# Patient Record
Sex: MALE | HISPANIC OR LATINO | Employment: OTHER | URBAN - METROPOLITAN AREA
[De-identification: names, ages, dates, MRNs, and addresses within clinical notes are randomized per-mention and may not be internally consistent; named-entity substitution may affect disease eponyms.]

---

## 2017-02-20 ENCOUNTER — OFFICE VISIT (OUTPATIENT)
Dept: ORTHOPEDIC SURGERY | Facility: CLINIC | Age: 67
End: 2017-02-20

## 2017-02-20 VITALS
HEART RATE: 53 BPM | DIASTOLIC BLOOD PRESSURE: 81 MMHG | HEIGHT: 64 IN | WEIGHT: 158 LBS | SYSTOLIC BLOOD PRESSURE: 186 MMHG | BODY MASS INDEX: 26.98 KG/M2

## 2017-02-20 DIAGNOSIS — R52 PAIN: Primary | ICD-10-CM

## 2017-02-20 DIAGNOSIS — M17.0 PRIMARY OSTEOARTHRITIS OF KNEES, BILATERAL: ICD-10-CM

## 2017-02-20 PROCEDURE — 20610 DRAIN/INJ JOINT/BURSA W/O US: CPT | Performed by: NURSE PRACTITIONER

## 2017-02-20 PROCEDURE — 99203 OFFICE O/P NEW LOW 30 MIN: CPT | Performed by: NURSE PRACTITIONER

## 2017-02-20 PROCEDURE — 73562 X-RAY EXAM OF KNEE 3: CPT | Performed by: NURSE PRACTITIONER

## 2017-02-20 RX ORDER — TERBINAFINE HYDROCHLORIDE 250 MG/1
250 TABLET ORAL DAILY
COMMUNITY
End: 2019-07-03

## 2017-02-20 RX ORDER — CARVEDILOL 12.5 MG/1
12.5 TABLET ORAL 2 TIMES DAILY WITH MEALS
COMMUNITY
End: 2019-07-03

## 2017-02-20 RX ORDER — LISINOPRIL 10 MG/1
10 TABLET ORAL DAILY
COMMUNITY
End: 2019-07-03

## 2017-02-20 RX ORDER — ALLOPURINOL 100 MG/1
100 TABLET ORAL DAILY
COMMUNITY
End: 2019-07-03

## 2017-02-20 RX ADMIN — LIDOCAINE HYDROCHLORIDE 4 ML: 10 INJECTION, SOLUTION INFILTRATION; PERINEURAL at 10:06

## 2017-02-20 RX ADMIN — BUPIVACAINE HYDROCHLORIDE 4 ML: 5 INJECTION, SOLUTION EPIDURAL; INTRACAUDAL at 10:06

## 2017-02-20 RX ADMIN — TRIAMCINOLONE ACETONIDE 80 MG: 40 INJECTION, SUSPENSION INTRA-ARTICULAR; INTRAMUSCULAR at 10:06

## 2017-02-20 NOTE — PROGRESS NOTES
Subjective:     Patient ID: Pato Charles is a 67 y.o. male.    Chief Complaint: Bilateral knee pain    History of Present Illness  Patient is 67 y.o male who presents with a reported four year history of pain at bilateral knees. Pain presents at medial and anterior aspect, increased pain noted with ascending and descending stairs, with positional changes such as from sitting to standing and in am when he wakes. 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 Rates pain at 8-10 out of 10, increased pain with activity, pain mildly relieved with rest. Has previously taken tylenol as needed for symptom relief however no longer taking because medication was not helpful. States this is the first time that he has ever had health insurance which is why he presents at this time for symptoms. Denies known injury bilateral knees. Denies presence of numbness or tingling at bilateral lower extremities. Denies experiencing low back pain. Denies all other concerns present at this time.      Social History     Occupational History   • Not on file.     Social History Main Topics   • Smoking status: Not on file   • Smokeless tobacco: Not on file   • Alcohol use Not on file   • Drug use: Not on file   • Sexual activity: Not on file      History reviewed. No pertinent past medical history.  History reviewed. No pertinent past surgical history.    No family history on file.      Review of Systems   Constitutional: Negative for chills, diaphoresis, fever and unexpected weight change.   HENT: Negative for hearing loss, nosebleeds, sore throat and tinnitus.    Eyes: Negative for pain and visual disturbance.   Respiratory: Negative for cough, shortness of breath and wheezing.    Cardiovascular: Negative for chest pain and palpitations.   Gastrointestinal: Negative for abdominal pain, diarrhea, nausea and vomiting.   Endocrine: Negative for cold intolerance,  "heat intolerance and polydipsia.   Genitourinary: Negative for difficulty urinating, dysuria and hematuria.   Musculoskeletal: Positive for arthralgias and joint swelling. Negative for myalgias.   Skin: Positive for rash. Negative for wound.   Allergic/Immunologic: Negative for environmental allergies.   Neurological: Negative for dizziness, syncope and numbness.   Hematological: Does not bruise/bleed easily.   Psychiatric/Behavioral: Negative for dysphoric mood and sleep disturbance. The patient is not nervous/anxious.            Objective:  Physical Exam    Vital signs reviewed.   General: No acute distress.  Eyes: conjunctiva clear; pupils equally round and reactive  ENT: external ears and nose atraumatic; oropharynx clear  CV: no peripheral edema  Resp: normal respiratory effort  Skin: no rashes or wounds; normal turgor  Psych: mood and affect appropriate; recent and remote memory intact    Vitals:    02/20/17 0934   BP: (!) 186/81   Pulse: 53   Weight: 158 lb (71.7 kg)   Height: 64\" (162.6 cm)     Last 2 weights    02/20/17  0934   Weight: 158 lb (71.7 kg)     Body mass index is 27.12 kg/(m^2).     Right Knee Exam     Tenderness   The patient is experiencing tenderness in the medial joint line and patella.    Range of Motion   Extension: 0   Flexion: 120     Tests   Priyanka:  Medial - negative Lateral - negative  Lachman:  Anterior - 1+    Posterior - negative  Drawer:       Anterior - negative    Posterior - negative  Varus: negative  Valgus: negative  Patellar Apprehension: positive    Other   Erythema: absent  Scars: absent  Sensation: normal  Pulse: present  Swelling: none  Other tests: no effusion present    Comments:  Crepitus throughout arc of motion      Left Knee Exam     Tenderness   The patient is experiencing tenderness in the medial joint line and patella.    Range of Motion   Extension: 0   Flexion: 120     Tests   Priyanka:  Medial - negative Lateral - negative  Lachman:  Anterior - 1+    " Posterior - negative  Drawer:       Anterior - negative     Posterior - negative  Varus: negative  Valgus: negative  Patellar Apprehension: positive    Other   Erythema: absent  Scars: absent  Sensation: normal  Pulse: present  Swelling: none  Effusion: no effusion present    Comments:  Crepitus throughout arc of motion              Imaging:  Bilateral Knee X-Ray  Indication: Pain    AP, Lateral, and [place] views    Findings:  No fracture  Normal soft tissues  Tricompartmental osteoarthritis bilateral knees, greater at medial aspect and patellofemoral joint    No prior studies were available for comparison.    Assessment:       1. Pain    2. Primary osteoarthritis of knees, bilateral          Plan:  1. Discussed plan of care with patient. Wishes to proceed with corticosteroid injections bilateral knees.   2. Discussed viscosupplement injections if not receiving significant relief with corticosteroid injections however will likely not complete secondary to cost. Will follow-up with patient after he returns from trip. Patient verbalized understanding of all information and agrees with plan of care. Denies all other concerns present at this time.     NICO query complete.    Large Joint Arthrocentesis  Date/Time: 2/20/2017 10:06 AM  Consent given by: patient  Site marked: site marked  Timeout: Immediately prior to procedure a time out was called to verify the correct patient, procedure, equipment, support staff and site/side marked as required   Supporting Documentation  Indications: pain   Procedure Details  Location: knee - Knee joint: BILATERAL.  Preparation: Patient was prepped and draped in the usual sterile fashion  Needle size: 22 G  Medications administered: 4 mL bupivacaine (PF) 0.5 %; 4 mL lidocaine 1 %; 80 mg triamcinolone acetonide 40 MG/ML  Patient tolerance: patient tolerated the procedure well with no immediate complications

## 2017-02-21 PROBLEM — M17.0 PRIMARY OSTEOARTHRITIS OF KNEES, BILATERAL: Status: ACTIVE | Noted: 2017-02-21

## 2017-02-21 RX ORDER — TRIAMCINOLONE ACETONIDE 40 MG/ML
80 INJECTION, SUSPENSION INTRA-ARTICULAR; INTRAMUSCULAR
Status: COMPLETED | OUTPATIENT
Start: 2017-02-20 | End: 2017-02-20

## 2017-02-21 RX ORDER — BUPIVACAINE HYDROCHLORIDE 5 MG/ML
4 INJECTION, SOLUTION EPIDURAL; INTRACAUDAL
Status: COMPLETED | OUTPATIENT
Start: 2017-02-20 | End: 2017-02-20

## 2017-02-21 RX ORDER — LIDOCAINE HYDROCHLORIDE 10 MG/ML
4 INJECTION, SOLUTION INFILTRATION; PERINEURAL
Status: COMPLETED | OUTPATIENT
Start: 2017-02-20 | End: 2017-02-20

## 2017-07-28 ENCOUNTER — OFFICE VISIT (OUTPATIENT)
Dept: ORTHOPEDIC SURGERY | Facility: CLINIC | Age: 67
End: 2017-07-28

## 2017-07-28 DIAGNOSIS — M17.0 PRIMARY OSTEOARTHRITIS OF KNEES, BILATERAL: Primary | ICD-10-CM

## 2017-07-28 PROCEDURE — 20610 DRAIN/INJ JOINT/BURSA W/O US: CPT | Performed by: NURSE PRACTITIONER

## 2017-07-28 RX ORDER — MELOXICAM 7.5 MG/1
7.5 TABLET ORAL DAILY
Qty: 30 TABLET | Refills: 2 | Status: SHIPPED | OUTPATIENT
Start: 2017-07-28 | End: 2017-08-31

## 2017-07-28 RX ADMIN — LIDOCAINE HYDROCHLORIDE 4 ML: 10 INJECTION, SOLUTION EPIDURAL; INFILTRATION; INTRACAUDAL; PERINEURAL at 14:34

## 2017-07-28 RX ADMIN — TRIAMCINOLONE ACETONIDE 80 MG: 40 INJECTION, SUSPENSION INTRA-ARTICULAR; INTRAMUSCULAR at 14:34

## 2017-07-28 RX ADMIN — BUPIVACAINE HYDROCHLORIDE 4 ML: 5 INJECTION, SOLUTION EPIDURAL; INTRACAUDAL at 14:34

## 2017-07-28 NOTE — PROGRESS NOTES
Subjective:     Patient ID: Pato Charles is a 67 y.o. male.    Chief Complaint: follow-up bilateral knee pain, primary osteoarthritis bilateral knees     History of Present Illness    Mr. Charles presents for follow-up bilateral knee pain. Received great success after receiving corticosteroid injections at last visit with this APRN on 02/20/2017. He now has health insurance which includes drug coverage and would like to discuss possibly receiving oral therapy for osteoarthritis. Has been doing very well with injections however pain has returned within the last two weeks and he is ready to discuss proceeding with corticosteroid injections once again. He is scheduled to leave for [facility] to visit his wife and children around August 15th and will return approximately three weeks after that time. Bilateral knee pain, present at medial joint line and patella. Increased with walking for long periods of time, ascending and descending stairs and with positional changes such as from seated to standing at attempting to walk. Denies bilateral knee giving away, locking or buckling. Rates pain at 7-8 out of 10, aching in nature. Denies presence of numbness or tingling bilateral lower extremities. Denies all other concerns at this time.      Social History     Occupational History   • Not on file.     Social History Main Topics   • Smoking status: Not on file   • Smokeless tobacco: Not on file   • Alcohol use Not on file   • Drug use: Not on file   • Sexual activity: Not on file      No past medical history on file.  No past surgical history on file.    No family history on file.      Review of Systems   Constitutional: Negative for chills, diaphoresis, fever and unexpected weight change.   HENT: Negative for hearing loss, nosebleeds, sore throat and tinnitus.    Eyes: Negative for pain and visual disturbance.   Respiratory: Negative for cough, shortness of breath and wheezing.    Cardiovascular: Negative for chest pain and  palpitations.   Gastrointestinal: Negative for abdominal pain, diarrhea, nausea and vomiting.   Endocrine: Negative for cold intolerance, heat intolerance and polydipsia.   Genitourinary: Negative for difficulty urinating, dysuria and hematuria.   Musculoskeletal: Positive for arthralgias. Negative for joint swelling and myalgias.   Skin: Negative for rash and wound.   Allergic/Immunologic: Negative for environmental allergies.   Neurological: Negative for dizziness, syncope and numbness.   Hematological: Does not bruise/bleed easily.   Psychiatric/Behavioral: Negative for dysphoric mood and sleep disturbance. The patient is not nervous/anxious.            Objective:  Physical Exam    General: No acute distress.  Eyes: conjunctiva clear; pupils equally round and reactive  ENT: external ears and nose atraumatic; oropharynx clear  CV: no peripheral edema  Resp: normal respiratory effort  Skin: no rashes or wounds; normal turgor  Psych: mood and affect appropriate; recent and remote memory intact    There were no vitals filed for this visit.  There were no vitals filed for this visit.  There is no height or weight on file to calculate BMI.     Ortho Exam      Right Knee Exam      Tenderness   The patient is experiencing tenderness in the medial joint line and patella.     Range of Motion   Extension: 0   Flexion: 120      Tests   Priyanka:  Medial - negative Lateral - negative  Lachman:  Anterior - 1+    Posterior - negative  Drawer:       Anterior - negative    Posterior - negative  Varus: negative  Valgus: negative  Patellar Apprehension: positive     Other   Erythema: absent  Scars: absent  Sensation: normal  Pulse: present  Swelling: moderate  Other tests: no effusion present     Comments:  Crepitus throughout arc of motion        Left Knee Exam      Tenderness   The patient is experiencing tenderness in the medial joint line and patella.  Range of Motion   Extension: 0   Flexion: 120  Tests   Priyanka:  Medial -  negative Lateral - negative  Lachman:  Anterior - 1+    Posterior - negative  Drawer:       Anterior - negative     Posterior - negative  Varus: negative  Valgus: negative  Patellar Apprehension: positive  Other   Erythema: absent  Scars: absent  Sensation: normal  Pulse: present  Swelling: mild  Effusion: none     Comments:  Crepitus throughout arc of motion    Assessment:       1. Primary osteoarthritis of knees, bilateral          Plan:  1. Discussed plan of care with patient. Wishes to proceed with corticosteroid injections bilateral knees.  2. Will start him on meloxicam 7.5 mg once daily. Encouraged him to find PCP and follow-up for lab work. Currently being seen at a clinic in [facility]. Will plan to see him when he returns from [facility]. Discussed possible visco supplement injections at bilateral knees along with cost to patient. Patient verbalized understanding of all information and agrees with plan of care. Denies all other concerns present at this time.       Large Joint Arthrocentesis  Date/Time: 7/28/2017 2:34 PM  Consent given by: patient  Site marked: site marked  Timeout: Immediately prior to procedure a time out was called to verify the correct patient, procedure, equipment, support staff and site/side marked as required   Supporting Documentation  Indications: pain   Procedure Details  Location: knee - Knee joint: BILATERAL.  Preparation: Patient was prepped and draped in the usual sterile fashion  Needle size: 22 G  Medications administered: 4 mL bupivacaine (PF) 0.5 %; 4 mL lidocaine PF 1% 1 %; 80 mg triamcinolone acetonide 40 MG/ML  Patient tolerance: patient tolerated the procedure well with no immediate complications

## 2017-07-29 RX ORDER — BUPIVACAINE HYDROCHLORIDE 5 MG/ML
4 INJECTION, SOLUTION EPIDURAL; INTRACAUDAL
Status: COMPLETED | OUTPATIENT
Start: 2017-07-28 | End: 2017-07-28

## 2017-07-29 RX ORDER — TRIAMCINOLONE ACETONIDE 40 MG/ML
80 INJECTION, SUSPENSION INTRA-ARTICULAR; INTRAMUSCULAR
Status: COMPLETED | OUTPATIENT
Start: 2017-07-28 | End: 2017-07-28

## 2017-07-29 RX ORDER — LIDOCAINE HYDROCHLORIDE 10 MG/ML
4 INJECTION, SOLUTION EPIDURAL; INFILTRATION; INTRACAUDAL; PERINEURAL
Status: COMPLETED | OUTPATIENT
Start: 2017-07-28 | End: 2017-07-28

## 2017-08-31 ENCOUNTER — OFFICE VISIT (OUTPATIENT)
Dept: ORTHOPEDIC SURGERY | Facility: CLINIC | Age: 67
End: 2017-08-31

## 2017-08-31 DIAGNOSIS — M17.0 PRIMARY OSTEOARTHRITIS OF KNEES, BILATERAL: Primary | ICD-10-CM

## 2017-08-31 PROCEDURE — 99213 OFFICE O/P EST LOW 20 MIN: CPT | Performed by: NURSE PRACTITIONER

## 2017-08-31 PROCEDURE — 20610 DRAIN/INJ JOINT/BURSA W/O US: CPT | Performed by: NURSE PRACTITIONER

## 2017-08-31 RX ORDER — MELOXICAM 15 MG/1
15 TABLET ORAL DAILY
Qty: 30 TABLET | Refills: 3 | Status: SHIPPED | OUTPATIENT
Start: 2017-08-31 | End: 2019-07-03

## 2017-09-12 ENCOUNTER — TELEPHONE (OUTPATIENT)
Dept: ORTHOPEDIC SURGERY | Facility: CLINIC | Age: 67
End: 2017-09-12

## 2017-12-04 ENCOUNTER — OFFICE VISIT (OUTPATIENT)
Dept: ORTHOPEDIC SURGERY | Facility: CLINIC | Age: 67
End: 2017-12-04

## 2017-12-04 DIAGNOSIS — R52 PAIN: Primary | ICD-10-CM

## 2017-12-04 DIAGNOSIS — M17.0 PRIMARY OSTEOARTHRITIS OF KNEES, BILATERAL: ICD-10-CM

## 2017-12-04 PROCEDURE — 20610 DRAIN/INJ JOINT/BURSA W/O US: CPT | Performed by: NURSE PRACTITIONER

## 2017-12-04 RX ORDER — LIDOCAINE HYDROCHLORIDE 10 MG/ML
4 INJECTION, SOLUTION EPIDURAL; INFILTRATION; INTRACAUDAL; PERINEURAL
Status: COMPLETED | OUTPATIENT
Start: 2017-12-04 | End: 2017-12-04

## 2017-12-04 RX ORDER — TRIAMCINOLONE ACETONIDE 40 MG/ML
80 INJECTION, SUSPENSION INTRA-ARTICULAR; INTRAMUSCULAR
Status: COMPLETED | OUTPATIENT
Start: 2017-12-04 | End: 2017-12-04

## 2017-12-04 RX ORDER — BUPIVACAINE HYDROCHLORIDE 5 MG/ML
4 INJECTION, SOLUTION EPIDURAL; INTRACAUDAL
Status: COMPLETED | OUTPATIENT
Start: 2017-12-04 | End: 2017-12-04

## 2017-12-04 RX ADMIN — BUPIVACAINE HYDROCHLORIDE 4 ML: 5 INJECTION, SOLUTION EPIDURAL; INTRACAUDAL at 15:46

## 2017-12-04 RX ADMIN — LIDOCAINE HYDROCHLORIDE 4 ML: 10 INJECTION, SOLUTION EPIDURAL; INFILTRATION; INTRACAUDAL; PERINEURAL at 15:46

## 2017-12-04 RX ADMIN — TRIAMCINOLONE ACETONIDE 80 MG: 40 INJECTION, SUSPENSION INTRA-ARTICULAR; INTRAMUSCULAR at 15:46

## 2017-12-04 NOTE — PROGRESS NOTES
Subjective:     Patient ID: Pato Charles is a 67 y.o. male.    Chief Complaint: Follow-up follow-up primary osteoarthritis bilateral knees    History of Present Illness    Mr. Charles presents back to clinic for follow-up of bilateral knee pain.  He received Visco supplement injections, Synvisc one on August 31, 2017 with significant symptom relief for the first 3 months.  Since that time he is began experiencing pain returned at the medial joint lines at bilateral knees.  Increased pain noted with all ambulatory activities and with change of position such as from seated to standing and attempting to walk.  Denies that he is able to undergo any surgery at this time.  Denies presence of numbness and tingling radiating down bilateral lower extremities.  Denies other concerns present this time.     Social History     Occupational History   • Not on file.     Social History Main Topics   • Smoking status: Former Smoker     Quit date: 2001   • Smokeless tobacco: Not on file   • Alcohol use Yes   • Drug use: Not on file   • Sexual activity: Not on file      No past medical history on file.  No past surgical history on file.    No family history on file.      Review of Systems   Constitutional: Negative for chills, diaphoresis, fever and unexpected weight change.   HENT: Negative for hearing loss, nosebleeds, sore throat and tinnitus.    Eyes: Negative for pain and visual disturbance.   Respiratory: Negative for cough, shortness of breath and wheezing.    Cardiovascular: Negative for chest pain and palpitations.   Gastrointestinal: Negative for abdominal pain, diarrhea, nausea and vomiting.   Endocrine: Negative for cold intolerance, heat intolerance and polydipsia.   Genitourinary: Negative for difficulty urinating, dysuria and hematuria.   Musculoskeletal: Positive for arthralgias. Negative for joint swelling and myalgias.   Skin: Negative for rash and wound.   Allergic/Immunologic: Negative for environmental allergies.    Neurological: Negative for dizziness, syncope and numbness.   Hematological: Does not bruise/bleed easily.   Psychiatric/Behavioral: Negative for dysphoric mood and sleep disturbance. The patient is not nervous/anxious.    All other systems reviewed and are negative.          Objective:  Physical Exam    General: No acute distress.  Eyes: conjunctiva clear; pupils equally round and reactive  ENT: external ears and nose atraumatic; oropharynx clear  CV: no peripheral edema  Resp: normal respiratory effort  Skin: no rashes or wounds; normal turgor  Psych: mood and affect appropriate; recent and remote memory intact    There were no vitals filed for this visit.  There were no vitals filed for this visit.  There is no height or weight on file to calculate BMI.     Ortho Exam      Left Knee Exam       Tenderness   The patient is experiencing tenderness in the medial joint line and patella.  Range of Motion   Extension: 0   Flexion: 120  Tests   Priyanka:  Medial - negative Lateral - negative  Lachman:  Anterior - 1+    Posterior - negative  Drawer:       Anterior - negative     Posterior - negative  Varus: negative  Valgus: negative  Patellar Apprehension: positive  Other   Erythema: absent  Scars: absent  Sensation: normal  Pulse: present  Swelling: mild  Effusion: none  Comments:  Crepitus throughout arc of motion    Assessment:       1. Pain    2. Primary osteoarthritis of knees, bilateral          Plan:  1. Discussed plan of care with patient.  Wishes to proceed with corticosteroid injections at bilateral knees.  We'll plan to see him back in 3-4 months as needed when pain returns.  Discussed with patient that he is able to receive Visco supplement injections again in March 2017.  2.  Patient encouraged to follow up with her primary care provider who can provide him his medications.  Encouraged to follow-up for primary care [facility] urgent care for immediate refill of most medication.  Patient verbalized understanding  of all information agrees with plan of care.  Denies other concerns present this time.  Large Joint Arthrocentesis  Date/Time: 12/4/2017 3:46 PM  Consent given by: patient  Site marked: site marked  Supporting Documentation  Indications: pain   Procedure Details  Location: shoulder - Shoulder joint: bilateral.  Preparation: Patient was prepped and draped in the usual sterile fashion  Needle size: 22 G  Medications administered: 80 mg triamcinolone acetonide 40 MG/ML; 4 mL lidocaine PF 1% 1 %; 4 mL bupivacaine (PF) 0.5 %  Patient tolerance: patient tolerated the procedure well with no immediate complications        Orders:  Orders Placed This Encounter   Procedures   • Large Joint Arthrocentesis     Dragon transcription disclaimer     Much of this encounter note is an electronic transcription/translation of spoken language to printed text. The electronic translation of spoken language may permit erroneous, or at times, nonsensical words or phrases to be inadvertently transcribed. Although I have reviewed the note for such errors, some may still exist.

## 2018-02-15 ENCOUNTER — OFFICE VISIT (OUTPATIENT)
Dept: ORTHOPEDIC SURGERY | Facility: CLINIC | Age: 68
End: 2018-02-15

## 2018-02-15 DIAGNOSIS — M17.0 PRIMARY OSTEOARTHRITIS OF KNEES, BILATERAL: Primary | ICD-10-CM

## 2018-02-15 PROCEDURE — 99212 OFFICE O/P EST SF 10 MIN: CPT | Performed by: NURSE PRACTITIONER

## 2018-02-15 PROCEDURE — 20610 DRAIN/INJ JOINT/BURSA W/O US: CPT | Performed by: NURSE PRACTITIONER

## 2018-02-15 RX ORDER — LIDOCAINE HYDROCHLORIDE 10 MG/ML
8 INJECTION, SOLUTION EPIDURAL; INFILTRATION; INTRACAUDAL; PERINEURAL
Status: COMPLETED | OUTPATIENT
Start: 2018-02-15 | End: 2018-02-15

## 2018-02-15 RX ORDER — TRIAMCINOLONE ACETONIDE 40 MG/ML
80 INJECTION, SUSPENSION INTRA-ARTICULAR; INTRAMUSCULAR
Status: COMPLETED | OUTPATIENT
Start: 2018-02-15 | End: 2018-02-15

## 2018-02-15 RX ADMIN — TRIAMCINOLONE ACETONIDE 80 MG: 40 INJECTION, SUSPENSION INTRA-ARTICULAR; INTRAMUSCULAR at 15:47

## 2018-02-15 RX ADMIN — LIDOCAINE HYDROCHLORIDE 8 ML: 10 INJECTION, SOLUTION EPIDURAL; INFILTRATION; INTRACAUDAL; PERINEURAL at 15:47

## 2018-02-15 NOTE — PROGRESS NOTES
Subjective:     Patient ID: Pato Charles is a 68 y.o. male.    Chief Complaint: Follow-up primary arthritis arthritis bilateral knees    History of Present Illness  Pato Charles returns to clinic today for evaluation of bilateral knees.  He was last seen by this APRN on December 4, 2017 received corticosteroid injections at that time.  Reports for the last 2 months she is received significant symptom relief after the injections.  Reports within the last 2-3 days he has noticed increased pain and swelling at bilateral knees.  Increased pain noted with all ambulatory activities positional changes such as from seated to standing and attempting to walk.  Pain present over the medial joint lines of bilateral knees as well as the patella.  Reports that his right knee the pain is radiating down the anterior aspect of his patella down to his foot.  He last received Synvisc one injections bilateral knees on August 31, 2017 and although wishes to proceed with those injections at this time it has not yet been 6 months.  He is unable to take oral NSAIDs secondary to his hypertension and primary care providers fear of any kind of kidney disease.  Denies that the knees are giving out on him but is experiencing pretty severe pain in bilateral knees.  Rates pain at a 9-10 out of a 10 aching in nature.  He does get mild relief with rest however when he attempts to stand again and walk pain is present again.  Denies that he is wishes to undergo any kind of surgery at this time.  Denies all other concerns present this time.     Social History     Occupational History   • Not on file.     Social History Main Topics   • Smoking status: Former Smoker     Quit date: 2001   • Smokeless tobacco: Not on file   • Alcohol use Yes   • Drug use: Not on file   • Sexual activity: Not on file      No past medical history on file.  No past surgical history on file.    No family history on file.      Review of Systems   Constitutional: Negative for  chills, diaphoresis, fever and unexpected weight change.   HENT: Negative for hearing loss, nosebleeds, sore throat and tinnitus.    Eyes: Negative for pain and visual disturbance.   Respiratory: Negative for cough, shortness of breath and wheezing.    Cardiovascular: Negative for chest pain and palpitations.   Gastrointestinal: Negative for abdominal pain, diarrhea, nausea and vomiting.   Endocrine: Negative for cold intolerance, heat intolerance and polydipsia.   Genitourinary: Negative for difficulty urinating, dysuria and hematuria.   Musculoskeletal: Positive for joint swelling and myalgias. Negative for arthralgias.   Skin: Negative for rash and wound.   Allergic/Immunologic: Negative for environmental allergies.   Neurological: Negative for dizziness, syncope and numbness.   Hematological: Does not bruise/bleed easily.   Psychiatric/Behavioral: Negative for dysphoric mood and sleep disturbance. The patient is not nervous/anxious.            Objective:  Physical Exam    General: No acute distress.  Eyes: conjunctiva clear; pupils equally round and reactive  ENT: external ears and nose atraumatic; oropharynx clear  CV: no peripheral edema  Resp: normal respiratory effort  Skin: no rashes or wounds; normal turgor  Psych: mood and affect appropriate; recent and remote memory intact    There were no vitals filed for this visit.  There were no vitals filed for this visit.  There is no height or weight on file to calculate BMI.     Right Knee Exam     Tenderness   The patient is experiencing tenderness in the medial joint line and patella.    Range of Motion   Extension: 0   Flexion: 120     Tests   Priyanka:  Medial - negative Lateral - negative  Lachman:  Anterior - 1+    Posterior - negative  Drawer:       Anterior - negative    Posterior - negative  Varus: negative  Valgus: negative  Patellar Apprehension: positive    Other   Erythema: absent  Scars: absent  Sensation: normal  Pulse: present  Swelling:  mild  Other tests: effusion present    Comments:  Crepitus throughout arc of motion      Left Knee Exam     Tenderness   The patient is experiencing tenderness in the medial joint line and patella.    Range of Motion   Extension: 0   Flexion: 120     Tests   Priyanka:  Medial - negative Lateral - negative  Lachman:  Anterior - 1+    Posterior - negative  Drawer:       Anterior - negative     Posterior - negative  Varus: negative  Valgus: negative  Patellar Apprehension: positive    Other   Erythema: absent  Scars: absent  Sensation: normal  Pulse: present  Swelling: mild  Effusion: effusion present    Comments:  Crepitus throughout arc of motion          Assessment:     No diagnosis found.      Plan:  1.  Discussed plan of care with patient.  Wishes to proceed with corticosteroid injection bilateral knees.  2.  We'll see him back in 4 weeks to complete Visco supplement injection bilateral knees, Synvisc one.  Patient verbalized understanding of all information agrees with plan of care.  Denies other concerns present this time.    Orders:  Orders Placed This Encounter   Procedures   • Large Joint Arthrocentesis       Medications:  No orders of the defined types were placed in this encounter.      Followup:  No Follow-up on file.    There are no diagnoses linked to this encounter.      Dictated utilizing Dragon dictation   Large Joint Arthrocentesis  Date/Time: 2/15/2018 3:47 PM  Consent given by: patient  Site marked: site marked  Timeout: Immediately prior to procedure a time out was called to verify the correct patient, procedure, equipment, support staff and site/side marked as required   Supporting Documentation  Indications: pain   Procedure Details  Location: knee - Knee joint: bilateral.  Preparation: Patient was prepped and draped in the usual sterile fashion  Needle size: 22 G  Approach: anterolateral  Medications administered: 8 mL lidocaine PF 1% 1 %; 80 mg triamcinolone acetonide 40 MG/ML  Patient  tolerance: patient tolerated the procedure well with no immediate complications

## 2018-03-30 ENCOUNTER — CLINICAL SUPPORT (OUTPATIENT)
Dept: ORTHOPEDIC SURGERY | Facility: CLINIC | Age: 68
End: 2018-03-30

## 2018-03-30 VITALS — HEIGHT: 64 IN | BODY MASS INDEX: 26.98 KG/M2 | WEIGHT: 158 LBS

## 2018-03-30 DIAGNOSIS — M17.0 PRIMARY OSTEOARTHRITIS OF KNEES, BILATERAL: Primary | ICD-10-CM

## 2018-03-30 PROCEDURE — 20610 DRAIN/INJ JOINT/BURSA W/O US: CPT | Performed by: NURSE PRACTITIONER

## 2018-03-30 NOTE — PROGRESS NOTES
Large Joint Arthrocentesis  Date/Time: 3/30/2018 3:57 PM  Consent given by: patient  Site marked: site marked  Timeout: Immediately prior to procedure a time out was called to verify the correct patient, procedure, equipment, support staff and site/side marked as required   Supporting Documentation  Indications: pain   Procedure Details  Location: knee - Knee joint: BILATERAL KNEE.  Preparation: Patient was prepped and draped in the usual sterile fashion  Needle size: 22 G  Approach: anterolateral  Medications administered: 48 mg hylan 48 MG/6ML  Patient tolerance: patient tolerated the procedure well with no immediate complications        Patient presented today for viscosupplement injection.  SynviscONE injection for the bilateral knees.  We will see patient back in 12 weeks.  We reviewed risks benefits and alternatives of the procedure and patient wished to proceed today and had all questions answered.

## 2018-06-25 ENCOUNTER — OFFICE VISIT (OUTPATIENT)
Dept: ORTHOPEDIC SURGERY | Facility: CLINIC | Age: 68
End: 2018-06-25

## 2018-06-25 VITALS — BODY MASS INDEX: 29.27 KG/M2 | HEIGHT: 61 IN | WEIGHT: 155 LBS

## 2018-06-25 DIAGNOSIS — M17.0 PRIMARY OSTEOARTHRITIS OF KNEES, BILATERAL: Primary | ICD-10-CM

## 2018-06-25 PROCEDURE — 20610 DRAIN/INJ JOINT/BURSA W/O US: CPT | Performed by: NURSE PRACTITIONER

## 2018-06-25 PROCEDURE — 99212 OFFICE O/P EST SF 10 MIN: CPT | Performed by: NURSE PRACTITIONER

## 2018-06-25 RX ORDER — TRIAMCINOLONE ACETONIDE 40 MG/ML
80 INJECTION, SUSPENSION INTRA-ARTICULAR; INTRAMUSCULAR
Status: COMPLETED | OUTPATIENT
Start: 2018-06-25 | End: 2018-06-25

## 2018-06-25 RX ORDER — LIDOCAINE HYDROCHLORIDE 10 MG/ML
4 INJECTION, SOLUTION EPIDURAL; INFILTRATION; INTRACAUDAL; PERINEURAL
Status: COMPLETED | OUTPATIENT
Start: 2018-06-25 | End: 2018-06-25

## 2018-06-25 RX ADMIN — TRIAMCINOLONE ACETONIDE 80 MG: 40 INJECTION, SUSPENSION INTRA-ARTICULAR; INTRAMUSCULAR at 10:40

## 2018-06-25 RX ADMIN — LIDOCAINE HYDROCHLORIDE 4 ML: 10 INJECTION, SOLUTION EPIDURAL; INFILTRATION; INTRACAUDAL; PERINEURAL at 10:40

## 2018-06-25 NOTE — PROGRESS NOTES
Subjective:     Patient ID: Pato Charles is a 68 y.o. male.    Chief Complaint: follow-up bilateral knee pain, primary osteoarthritis bilateral knees    History of Present Illness    Mr. Charles presents back to clinic for follow-up bilateral knee pain.  He was last seen by this APRN in March received corticosteroid injections at that time which did provide symptom relief.  He presents back with increased pain over the medial aspects of bilateral knees with all ambulatory activities and with rest.  Denies that the knees are giving out on him buckling, or locking.  Denies that he is ready to proceed with surgery at this time wishes to discuss possible Visco supplement injections.  Denies other concerns present.     Social History     Occupational History   • Not on file.     Social History Main Topics   • Smoking status: Former Smoker     Quit date: 2001   • Smokeless tobacco: Never Used   • Alcohol use Yes   • Drug use: No   • Sexual activity: Defer      History reviewed. No pertinent past medical history.  History reviewed. No pertinent surgical history.    History reviewed. No pertinent family history.      Review of Systems   Constitutional: Negative for chills, diaphoresis, fever and unexpected weight change.   HENT: Negative for hearing loss, nosebleeds, sore throat and tinnitus.    Eyes: Negative for pain and visual disturbance.   Respiratory: Negative for cough, shortness of breath and wheezing.    Cardiovascular: Negative for chest pain and palpitations.   Gastrointestinal: Negative for abdominal pain, diarrhea, nausea and vomiting.   Endocrine: Negative for cold intolerance, heat intolerance and polydipsia.   Genitourinary: Negative for difficulty urinating, dysuria and hematuria.   Musculoskeletal: Positive for arthralgias and myalgias. Negative for joint swelling.   Skin: Negative for rash and wound.   Allergic/Immunologic: Negative for environmental allergies.   Neurological: Negative for dizziness, syncope  "and numbness.   Hematological: Does not bruise/bleed easily.   Psychiatric/Behavioral: Negative for dysphoric mood and sleep disturbance. The patient is not nervous/anxious.            Objective:  Physical Exam    General: No acute distress.  Eyes: conjunctiva clear; pupils equally round and reactive  ENT: external ears and nose atraumatic; oropharynx clear  CV: no peripheral edema  Resp: normal respiratory effort  Skin: no rashes or wounds; normal turgor  Psych: mood and affect appropriate; recent and remote memory intact    Vitals:    06/25/18 1005   Weight: 70.3 kg (155 lb)   Height: 154.9 cm (61\")     1    06/25/18  1005   Weight: 70.3 kg (155 lb)     Body mass index is 29.29 kg/m².     Right Knee Exam     Tenderness   The patient is experiencing tenderness in the medial joint line.    Range of Motion   Extension: 5   Flexion: 110     Tests   Priyanka:  Medial - positive Lateral - negative  Lachman:  Anterior - positive    Posterior - negative  Drawer:       Anterior - negative    Posterior - negative  Varus: negative  Valgus: negative    Other   Erythema: absent  Sensation: normal  Pulse: present  Swelling: mild  Other tests: no effusion present    Comments:  Crepitus throughout arc of motion      Left Knee Exam     Tenderness   The patient is experiencing tenderness in the medial joint line.    Range of Motion   Extension: 5   Flexion: 110     Tests   Priyanka:  Medial - positive Lateral - negative  Lachman:  Anterior - positive    Posterior - negative  Drawer:       Anterior - negative     Posterior - negative  Varus: negative  Valgus: negative    Other   Erythema: absent  Sensation: normal  Pulse: present  Swelling: mild  Effusion: no effusion present    Comments:  Crepitus throughout arc of motion          Assessment:       1. Primary osteoarthritis of knees, bilateral          Plan:  1. Discussed plan of care with patient. Wishes to proceed with corticosteroid injection bilateral knees. Will plan to see " him in three months, prn will repeat corticosteroid injections at that time as needed. Eligible for viscosupplement injections after October 1. Patient verbalized understanding of all information and agrees with plan of care. Denies all other concerns present at this time.     Orders:  Orders Placed This Encounter   Procedures   • Large Joint Arthrocentesis     Dictated utilizing Dragon dictation   Large Joint Arthrocentesis  Date/Time: 6/25/2018 10:40 AM  Consent given by: patient  Site marked: site marked  Timeout: Immediately prior to procedure a time out was called to verify the correct patient, procedure, equipment, support staff and site/side marked as required   Supporting Documentation  Indications: pain   Procedure Details  Location: knee - Knee joint: bilateral.  Preparation: Patient was prepped and draped in the usual sterile fashion  Needle size: 22 G  Approach: anterolateral  Medications administered: 4 mL lidocaine PF 1% 1 %; 80 mg triamcinolone acetonide 40 MG/ML  Patient tolerance: patient tolerated the procedure well with no immediate complications

## 2018-09-17 ENCOUNTER — OFFICE VISIT (OUTPATIENT)
Dept: ORTHOPEDIC SURGERY | Facility: CLINIC | Age: 68
End: 2018-09-17

## 2018-09-17 VITALS — WEIGHT: 150 LBS | HEIGHT: 60 IN | BODY MASS INDEX: 29.45 KG/M2

## 2018-09-17 DIAGNOSIS — M17.0 PRIMARY OSTEOARTHRITIS OF KNEES, BILATERAL: Primary | ICD-10-CM

## 2018-09-17 PROCEDURE — 20610 DRAIN/INJ JOINT/BURSA W/O US: CPT | Performed by: NURSE PRACTITIONER

## 2018-09-17 PROCEDURE — 99212 OFFICE O/P EST SF 10 MIN: CPT | Performed by: NURSE PRACTITIONER

## 2018-09-17 RX ORDER — AMLODIPINE BESYLATE 2.5 MG/1
2.5 TABLET ORAL DAILY
Refills: 0 | COMMUNITY
Start: 2018-08-07 | End: 2019-07-03

## 2018-09-17 RX ADMIN — TRIAMCINOLONE ACETONIDE 80 MG: 40 INJECTION, SUSPENSION INTRA-ARTICULAR; INTRAMUSCULAR at 11:54

## 2018-09-17 RX ADMIN — LIDOCAINE HYDROCHLORIDE 4 ML: 20 INJECTION, SOLUTION EPIDURAL; INFILTRATION; INTRACAUDAL; PERINEURAL at 11:54

## 2018-09-17 NOTE — PROGRESS NOTES
Subjective:     Patient ID: Pato Charles is a 68 y.o. male.    Chief Complaint:  Follow-up primary osteoarthritis bilateral knees     History of Present Illness  Pato Charles presents to clinic for follow-up bilateral knee pain. Last seen by this APRN on 06/25/2018, received corticosteroid injections at that time with approximately 75% relief. He presents today for evaluation and to discuss possible steroid injections before he leaves for the next one month to return home to visit family. He is pleased with the relief that he has received from steroid injections. Pain recently returned within last 2 weeks, increased pain noted with climbing ladder, long periods of ambulating and standing. Denies that the knees are giving out on him, locking or buckling. Denies presence of numbness and tingling bilateral lower extremities. He denies all other concerns present at this time.        Social History     Occupational History   • Not on file.     Social History Main Topics   • Smoking status: Former Smoker     Quit date: 2001   • Smokeless tobacco: Never Used   • Alcohol use Yes   • Drug use: No   • Sexual activity: Defer      History reviewed. No pertinent past medical history.  History reviewed. No pertinent surgical history.    History reviewed. No pertinent family history.      Review of Systems   Constitutional: Negative for chills, diaphoresis, fever and unexpected weight change.   HENT: Negative for hearing loss, nosebleeds, sore throat and tinnitus.    Eyes: Negative for pain and visual disturbance.   Respiratory: Negative for cough, shortness of breath and wheezing.    Cardiovascular: Negative for chest pain and palpitations.   Gastrointestinal: Negative for abdominal pain, diarrhea, nausea and vomiting.   Endocrine: Negative for cold intolerance, heat intolerance and polydipsia.   Genitourinary: Negative for difficulty urinating, dysuria and hematuria.   Musculoskeletal: Positive for arthralgias and myalgias.  "Negative for joint swelling.   Skin: Negative for rash and wound.   Allergic/Immunologic: Negative for environmental allergies.   Neurological: Negative for dizziness, syncope and numbness.   Hematological: Does not bruise/bleed easily.   Psychiatric/Behavioral: Negative for dysphoric mood and sleep disturbance. The patient is not nervous/anxious.            Objective:  Physical Exam    General: No acute distress.  Eyes: conjunctiva clear; pupils equally round and reactive  ENT: external ears and nose atraumatic; oropharynx clear  CV: no peripheral edema  Resp: normal respiratory effort  Skin: no rashes or wounds; normal turgor  Psych: mood and affect appropriate; recent and remote memory intact    Vitals:    09/17/18 1135   Weight: 68 kg (150 lb)   Height: 152.4 cm (60\")     1    09/17/18  1135   Weight: 68 kg (150 lb)     Body mass index is 29.29 kg/m².     Ortho Exam    Right Knee Exam      Tenderness   The patient is experiencing tenderness in the medial joint line.     Range of Motion   Extension: 5   Flexion: 110      Tests   Priyanka:  Medial - positive Lateral - negative  Lachman:  Anterior - positive    Posterior - negative  Drawer:       Anterior - negative    Posterior - negative  Varus: negative  Valgus: negative     Other   Erythema: absent  Sensation: normal  Pulse: present  Swelling: mild  Other tests: no effusion present     Comments:  Crepitus throughout arc of motion        Left Knee Exam      Tenderness   The patient is experiencing tenderness in the medial joint line.     Range of Motion   Extension: 5   Flexion: 110      Tests   Priyanka:  Medial - positive Lateral - negative  Lachman:  Anterior - positive    Posterior - negative  Drawer:       Anterior - negative     Posterior - negative  Varus: negative  Valgus: negative     Other   Erythema: absent  Sensation: normal  Pulse: present  Swelling: mild  Effusion: no effusion present     Comments:  Crepitus throughout arc of " motion      Assessment:       1. Primary osteoarthritis of knees, bilateral          Plan:  1.  Discussed plan of care with patient.  Wishes to proceed with cortical steroid injection bilateral knees.  Plan to see him back in 1 month's when he returns and we will start Visco supplement injections at that time.  We have completed Synvisc one in past and he wishes to proceed with that.  Patient verbalized understanding long information agrees with plan of care.  He denies all other concerns that he has at this time.  Large Joint Arthrocentesis  Date/Time: 9/17/2018 11:54 AM  Consent given by: patient  Site marked: site marked  Timeout: Immediately prior to procedure a time out was called to verify the correct patient, procedure, equipment, support staff and site/side marked as required   Supporting Documentation  Indications: pain   Procedure Details  Location: knee - Knee joint: BILATERAL KNEE.  Needle size: 22 G  Approach: anterolateral  Medications administered: 4 mL lidocaine PF 2% 2 %; 80 mg triamcinolone acetonide 40 MG/ML  Patient tolerance: patient tolerated the procedure well with no immediate complications            Orders:  Orders Placed This Encounter   Procedures   • Large Joint Arthrocentesis     Dictated utilizing Dragon dictation

## 2018-09-20 RX ORDER — LIDOCAINE HYDROCHLORIDE 20 MG/ML
4 INJECTION, SOLUTION EPIDURAL; INFILTRATION; INTRACAUDAL; PERINEURAL
Status: COMPLETED | OUTPATIENT
Start: 2018-09-17 | End: 2018-09-17

## 2018-09-20 RX ORDER — TRIAMCINOLONE ACETONIDE 40 MG/ML
80 INJECTION, SUSPENSION INTRA-ARTICULAR; INTRAMUSCULAR
Status: COMPLETED | OUTPATIENT
Start: 2018-09-17 | End: 2018-09-17

## 2018-12-13 ENCOUNTER — OFFICE VISIT (OUTPATIENT)
Dept: ORTHOPEDIC SURGERY | Facility: CLINIC | Age: 68
End: 2018-12-13

## 2018-12-13 DIAGNOSIS — R52 PAIN: Primary | ICD-10-CM

## 2018-12-13 DIAGNOSIS — M17.0 PRIMARY OSTEOARTHRITIS OF KNEES, BILATERAL: ICD-10-CM

## 2018-12-13 PROCEDURE — 20610 DRAIN/INJ JOINT/BURSA W/O US: CPT | Performed by: NURSE PRACTITIONER

## 2018-12-13 NOTE — PROGRESS NOTES
Procedure   Large Joint Arthrocentesis  Date/Time: 12/13/2018 11:37 AM  Consent given by: patient  Site marked: site marked  Timeout: Immediately prior to procedure a time out was called to verify the correct patient, procedure, equipment, support staff and site/side marked as required   Supporting Documentation  Indications: pain   Procedure Details  Location: knee - Knee joint: BILATERAL.  Preparation: Patient was prepped and draped in the usual sterile fashion  Needle size: 22 G  Approach: anterolateral  Medications administered: 88 mg Hyaluronan 88 MG/4ML  Patient tolerance: patient tolerated the procedure well with no immediate complications          Patient presented today for viscosupplement injection.  This single injections for bilateral  knees.  We will see patient back in 3 months, prn.  We reviewed risks benefits and alternatives of the procedure and patient wished to proceed today and had all questions answered.

## 2019-03-20 ENCOUNTER — OFFICE VISIT (OUTPATIENT)
Dept: ORTHOPEDIC SURGERY | Facility: CLINIC | Age: 69
End: 2019-03-20

## 2019-03-20 DIAGNOSIS — M17.0 PRIMARY OSTEOARTHRITIS OF KNEES, BILATERAL: Primary | ICD-10-CM

## 2019-03-20 PROCEDURE — 99212 OFFICE O/P EST SF 10 MIN: CPT | Performed by: NURSE PRACTITIONER

## 2019-03-20 PROCEDURE — 20610 DRAIN/INJ JOINT/BURSA W/O US: CPT | Performed by: NURSE PRACTITIONER

## 2019-03-20 RX ORDER — TRIAMCINOLONE ACETONIDE 40 MG/ML
80 INJECTION, SUSPENSION INTRA-ARTICULAR; INTRAMUSCULAR
Status: COMPLETED | OUTPATIENT
Start: 2019-03-20 | End: 2019-03-20

## 2019-03-20 RX ORDER — LIDOCAINE HYDROCHLORIDE 10 MG/ML
8 INJECTION, SOLUTION EPIDURAL; INFILTRATION; INTRACAUDAL; PERINEURAL
Status: COMPLETED | OUTPATIENT
Start: 2019-03-20 | End: 2019-03-20

## 2019-03-20 RX ADMIN — LIDOCAINE HYDROCHLORIDE 8 ML: 10 INJECTION, SOLUTION EPIDURAL; INFILTRATION; INTRACAUDAL; PERINEURAL at 11:45

## 2019-03-20 RX ADMIN — TRIAMCINOLONE ACETONIDE 80 MG: 40 INJECTION, SUSPENSION INTRA-ARTICULAR; INTRAMUSCULAR at 11:45

## 2019-03-20 NOTE — PROGRESS NOTES
Subjective:     Patient ID: Pato Charles is a 69 y.o. male.    Chief Complaint:  Follow-up primary osteoarthritis bilateral knees  History of Present Illness  Pato Charles presents back to clinic for evaluation of bilateral knee pain.  Maximal tenderness the right knee along with swelling rates pain at a 7 to an 8 out of 10 aching in nature along with pain a little less at the left knee and swelling.  Rates discomfort at a 5-6 out of a 10 aching in nature.  He did travel home and received a corticosteroid injection via [facility] while he was there secondary to the swelling he was experiencing a bilateral knees which did help.  He was last seen by this APRN on 2018 received Visco supplementation injection to bilateral knees which was helpful.  He is not ready to proceed with the knee joint replacement surgery at this time.  He is pleased with the symptom relief that he is receiving with the Visco supplementation injections as well as the corticosteroid injections.  Z denies that the knees are locking, catching or giving away.  Maximal tenderness over the medial and lateral joint lines as well as the anterior.  Positive for pain noted at the posterior joint line when he experiences severe swelling.  Denies that he is experiencing numbness or tingling radiating down bilateral lower extremities.  He does experience increased swelling when he is seated for extended periods of time such as when driving in a car as well as stiffness when he attempts transitional changes such as from seated to standing attempting to walk.  Denies other concerns present at this time.    Social History     Occupational History   • Not on file   Tobacco Use   • Smoking status: Former Smoker     Last attempt to quit:      Years since quittin.2   • Smokeless tobacco: Never Used   Substance and Sexual Activity   • Alcohol use: Yes   • Drug use: No   • Sexual activity: Defer      No past medical history on file.  No past surgical history  on file.    No family history on file.      Review of Systems   Constitutional: Negative for chills, diaphoresis, fever and unexpected weight change.   HENT: Negative for hearing loss, nosebleeds, sore throat and tinnitus.    Eyes: Negative for pain and visual disturbance.   Respiratory: Negative for cough, shortness of breath and wheezing.    Cardiovascular: Negative for chest pain and palpitations.   Gastrointestinal: Negative for abdominal pain, diarrhea, nausea and vomiting.   Endocrine: Negative for cold intolerance, heat intolerance and polydipsia.   Genitourinary: Negative for difficulty urinating, dysuria and hematuria.   Musculoskeletal: Positive for arthralgias. Negative for joint swelling and myalgias.   Skin: Negative for rash and wound.   Allergic/Immunologic: Negative for environmental allergies.   Neurological: Negative for dizziness, syncope and numbness.   Hematological: Does not bruise/bleed easily.   Psychiatric/Behavioral: Negative for dysphoric mood and sleep disturbance. The patient is not nervous/anxious.            Objective:  Physical Exam    General: No acute distress.  Eyes: conjunctiva clear; pupils equally round and reactive  ENT: external ears and nose atraumatic; oropharynx clear  CV: no peripheral edema  Resp: normal respiratory effort  Skin: no rashes or wounds; normal turgor  Psych: mood and affect appropriate; recent and remote memory intact    There were no vitals filed for this visit.  There were no vitals filed for this visit.  There is no height or weight on file to calculate BMI.      Right Knee Exam     Tenderness   The patient is experiencing tenderness in the medial joint line, patella and lateral joint line.    Range of Motion   Extension: 5   Flexion: 110     Tests   Priyanka:  Medial - positive Lateral - negative  Varus: negative Valgus: negative  Lachman:  Anterior - 1+      Drawer:  Anterior - negative      Patellar apprehension: positive    Other   Erythema:  absent  Sensation: normal  Pulse: present  Swelling: moderate  Effusion: effusion (mild) present    Comments:  Positive crepitus there are commission   positive active patellar compression test      Left Knee Exam     Tenderness   The patient is experiencing tenderness in the medial joint line, patella and lateral joint line.    Range of Motion   Extension: 5   Flexion: 110     Tests   Priyanka:  Medial - positive Lateral - negative  Varus: negative Valgus: negative  Lachman:  Anterior - 1+      Drawer:  Anterior - negative       Patellar apprehension: positive    Other   Erythema: absent  Sensation: normal  Pulse: present  Swelling: moderate  Effusion: effusion (minimal ) present    Comments:  Positive crepitus throughout motion  Positive active patellar compression test           Assessment:        1. Primary osteoarthritis of knees, bilateral           Plan:  1.  Discussed Medicare with patient.  Wish to proceed with corticosteroid injection bilateral knees.  We will plan to follow-up with him in approximately 3 months when he is eligible for Visco supplementation injection again.  Patient verbalized understanding of all information agrees plan of care.  Denies all other concerns that he has at this time.  Large Joint Arthrocentesis  Date/Time: 3/20/2019 11:45 AM  Consent given by: patient  Site marked: site marked  Timeout: Immediately prior to procedure a time out was called to verify the correct patient, procedure, equipment, support staff and site/side marked as required   Supporting Documentation  Indications: pain   Procedure Details  Location: knee - Knee joint: bilateral.  Preparation: Patient was prepped and draped in the usual sterile fashion  Needle size: 22 G  Approach: superior  Medications administered: 8 mL lidocaine PF 1% 1 %; 80 mg triamcinolone acetonide 40 MG/ML  Patient tolerance: patient tolerated the procedure well with no immediate complications          Orders:  Orders Placed This  Encounter   Procedures   • Large Joint Arthrocentesis       Dictated utilizing Dragon dictation

## 2019-07-03 ENCOUNTER — OFFICE VISIT (OUTPATIENT)
Dept: ORTHOPEDIC SURGERY | Facility: CLINIC | Age: 69
End: 2019-07-03

## 2019-07-03 VITALS — HEIGHT: 60 IN | WEIGHT: 150 LBS | BODY MASS INDEX: 29.45 KG/M2

## 2019-07-03 DIAGNOSIS — R52 PAIN: Primary | ICD-10-CM

## 2019-07-03 DIAGNOSIS — M17.0 PRIMARY OSTEOARTHRITIS OF KNEES, BILATERAL: ICD-10-CM

## 2019-07-03 DIAGNOSIS — M10.00 ACUTE IDIOPATHIC GOUT, UNSPECIFIED SITE: ICD-10-CM

## 2019-07-03 PROCEDURE — 99213 OFFICE O/P EST LOW 20 MIN: CPT | Performed by: NURSE PRACTITIONER

## 2019-07-03 PROCEDURE — 20610 DRAIN/INJ JOINT/BURSA W/O US: CPT | Performed by: NURSE PRACTITIONER

## 2019-07-03 PROCEDURE — 73562 X-RAY EXAM OF KNEE 3: CPT | Performed by: NURSE PRACTITIONER

## 2019-07-03 RX ORDER — MELOXICAM 15 MG/1
15 TABLET ORAL DAILY
Qty: 30 TABLET | Refills: 3 | Status: SHIPPED | OUTPATIENT
Start: 2019-07-03 | End: 2020-01-09

## 2019-07-03 RX ORDER — ALLOPURINOL 100 MG/1
100 TABLET ORAL DAILY
Qty: 30 TABLET | Refills: 0 | Status: SHIPPED | OUTPATIENT
Start: 2019-07-03 | End: 2019-09-03 | Stop reason: SDUPTHER

## 2019-07-03 NOTE — PROGRESS NOTES
Subjective:     Patient ID: Pato Charles is a 69 y.o. male.    Chief Complaint:  Follow-up primary osteoarthritis bilateral knees  Left foot pain, new issue to examiner   History of Present Illness  Pato Charles presents back to clinic for follow-up of bilateral knee pain as well as pain at the left foot.  Approximately 3 weeks ago he discontinued all medications including medication for gout.  Maximal tenderness left foot at the dorsal aspect of the foot.  Increased pain noted with all weightbearing activities mild symptom relief with rest.  Bilateral knee pain present at the anterior aspect as well as the medial and lateral joint line.  Rates discomfort at a 7 to an 8 out of 10 aching throbbing in nature.  Was last seen by his APRN 3/20/2019 received corticosteroid injection at that time but in the past has received Visco supplementation injections which have provided him with significant symptom relief for 3 months or greater.  He is not in a position where he can undergo total knee replacement at this time.  Denies a locking of the knees, catching or giving way.  Denies that he is experiencing numbness or tingling down bilateral lower extremities.     Social History     Occupational History   • Not on file   Tobacco Use   • Smoking status: Former Smoker     Last attempt to quit:      Years since quittin.5   • Smokeless tobacco: Never Used   Substance and Sexual Activity   • Alcohol use: Yes   • Drug use: No   • Sexual activity: Defer      History reviewed. No pertinent past medical history.  History reviewed. No pertinent surgical history.    History reviewed. No pertinent family history.      Review of Systems   Constitutional: Negative for appetite change, chills, diaphoresis, fever and unexpected weight change.   HENT: Negative for hearing loss, nosebleeds, sore throat and tinnitus.    Eyes: Negative for pain and visual disturbance.   Respiratory: Negative for cough, shortness of breath and wheezing.   "  Cardiovascular: Negative for chest pain and palpitations.   Gastrointestinal: Negative for abdominal pain, diarrhea, nausea and vomiting.   Endocrine: Negative for cold intolerance, heat intolerance and polydipsia.   Genitourinary: Negative for difficulty urinating, dysuria and hematuria.   Musculoskeletal: Positive for arthralgias, joint swelling and myalgias.   Skin: Negative for rash and wound.   Allergic/Immunologic: Negative for environmental allergies.   Neurological: Negative for dizziness, syncope and numbness.   Hematological: Does not bruise/bleed easily.   Psychiatric/Behavioral: Negative for dysphoric mood and sleep disturbance. The patient is not nervous/anxious.            Objective:  Physical Exam    General: No acute distress.  Eyes: conjunctiva clear; pupils equally round and reactive  ENT: external ears and nose atraumatic; oropharynx clear  CV: no peripheral edema  Resp: normal respiratory effort  Skin: no rashes or wounds; normal turgor  Psych: mood and affect appropriate; recent and remote memory intact    Vitals:    07/03/19 1338   Weight: 68 kg (150 lb)   Height: 152.4 cm (60\")         07/03/19  1338   Weight: 68 kg (150 lb)     Body mass index is 29.29 kg/m².      Ortho Exam      Right Knee Exam      Tenderness   The patient is experiencing tenderness in the medial joint line, patella and lateral joint line.     Range of Motion   Extension: 5   Flexion: 115      Tests   Priyanka:  Medial - positive Lateral - negative  Varus: negative Valgus: negative  Lachman:  Anterior - 1+      Drawer:  Anterior - negative      Patellar apprehension: positive     Other   Erythema: absent  Sensation: normal  Pulse: present  Swelling: moderate  Effusion: effusion (moderate) present     Comments:  Positive crepitus there are commission   positive active patellar compression test     Left Knee Exam      Tenderness   The patient is experiencing tenderness in the medial joint line, patella and lateral joint " line.     Range of Motion   Extension: 5   Flexion: 115      Tests   Priyanka:  Medial - positive Lateral - negative  Varus: negative Valgus: negative  Lachman:  Anterior - 1+      Drawer:  Anterior - negative       Patellar apprehension: positive     Other   Erythema: absent  Sensation: normal  Pulse: present  Swelling: moderate  Effusion: effusion (mild) present     Comments:  Positive crepitus throughout motion  Positive active patellar compression test    Left foot exam:  Maximal tenderness dorsal aspect left foot, lateral side, positive mild swelling, mild erythema, negative abrasion, mild warmth negative for s/s of infection, flex/extend all digits left foot, brisk cap refill all digits, 2+ dorsalis pedis pulse  Imaging:  Bilateral Knee X-Ray  Indication: Pain    AP, Lateral, and [place] views    Findings:  No fracture  No bony lesion  Normal soft tissues  Advanced tricompartmental osteoarthritis bilateral knees with bone-on-bone articulation medial compartment and patellofemoral joint  Prior studies were available for comparison.    Assessment:        1. Pain    2. Primary osteoarthritis of knees, bilateral    3. Acute idiopathic gout, unspecified site           Plan:  1.  Discussed plan of care with patient.  Wishes to proceed with Visco supplementation injections Orthovisc No. 1 at today's visit bilateral knees.  We will plan to see him back in 1 week for the second injection of bilateral knees and the following week for the third injection bilateral knees.  2.  We will resume his meloxicam as allopurinol.  Discussed with patient that he is getting it back in with his primary care for evaluation and continuation of other medications.  Patient verbalized understanding of all information agrees with plan of care.  Denies other concerns present at this time.  Large Joint Arthrocentesis  Date/Time: 7/3/2019 2:44 PM  Consent given by: patient  Site marked: site marked  Timeout: Immediately prior to procedure a  time out was called to verify the correct patient, procedure, equipment, support staff and site/side marked as required   Supporting Documentation  Indications: pain   Procedure Details  Location: knee - Knee joint: bilateral.  Preparation: Patient was prepped and draped in the usual sterile fashion  Needle size: 22 G  Approach: superior  Medications administered: 30 mg Hyaluronan 30 MG/2ML  Patient tolerance: patient tolerated the procedure well with no immediate complications          Orders:  Orders Placed This Encounter   Procedures   • Large Joint Arthrocentesis   • XR Foot 3+ View Left   • XR Knee 3 View Bilateral       I ordered and reviewed the NICO today.     Dictated utilizing Dragon dictation

## 2019-07-10 ENCOUNTER — CLINICAL SUPPORT (OUTPATIENT)
Dept: ORTHOPEDIC SURGERY | Facility: CLINIC | Age: 69
End: 2019-07-10

## 2019-07-10 DIAGNOSIS — R52 PAIN: Primary | ICD-10-CM

## 2019-07-10 DIAGNOSIS — M17.0 PRIMARY OSTEOARTHRITIS OF KNEES, BILATERAL: ICD-10-CM

## 2019-07-10 PROCEDURE — 20610 DRAIN/INJ JOINT/BURSA W/O US: CPT | Performed by: NURSE PRACTITIONER

## 2019-07-10 NOTE — PROGRESS NOTES
Procedure   Large Joint Arthrocentesis  Date/Time: 7/10/2019 2:29 PM  Consent given by: patient  Site marked: site marked  Supporting Documentation  Indications: pain   Procedure Details  Location: knee - Knee joint: BILATERAL.  Preparation: Patient was prepped and draped in the usual sterile fashion  Needle size: 22 G  Approach: anterolateral  Medications administered: 30 mg Hyaluronan 30 MG/2ML  Patient tolerance: patient tolerated the procedure well with no immediate complications          Patient presented today for viscosupplement injection.  This is the second injection for the bilateral knees.  We will see patient back in one week.  We reviewed risks benefits and alternatives of the procedure and patient wished to proceed today and had all questions answered.

## 2019-07-25 ENCOUNTER — CLINICAL SUPPORT (OUTPATIENT)
Dept: ORTHOPEDIC SURGERY | Facility: CLINIC | Age: 69
End: 2019-07-25

## 2019-07-25 DIAGNOSIS — R52 PAIN: Primary | ICD-10-CM

## 2019-07-25 DIAGNOSIS — M17.0 PRIMARY OSTEOARTHRITIS OF KNEES, BILATERAL: ICD-10-CM

## 2019-07-25 PROCEDURE — 20610 DRAIN/INJ JOINT/BURSA W/O US: CPT | Performed by: NURSE PRACTITIONER

## 2019-07-25 NOTE — PROGRESS NOTES
Large Joint Arthrocentesis  Date/Time: 7/25/2019 9:34 AM  Consent given by: patient  Site marked: site marked  Timeout: Immediately prior to procedure a time out was called to verify the correct patient, procedure, equipment, support staff and site/side marked as required   Supporting Documentation  Indications: pain   Procedure Details  Location: knee (bilateral) - Knee joint: bilateral.  Preparation: Patient was prepped and draped in the usual sterile fashion  Needle size: 22 G  Approach: anterolateral  Medications administered: 30 mg Hyaluronan 30 MG/2ML  Patient tolerance: patient tolerated the procedure well with no immediate complications        Patient presented today for viscosupplement injection.  This is the third injection for the bilateral knees.  We will see patient back in six weeks as needed.  We reviewed risks benefits and alternatives of the procedure and patient wished to proceed today and had all questions answered.

## 2019-09-04 RX ORDER — ALLOPURINOL 100 MG/1
TABLET ORAL
Qty: 30 TABLET | Refills: 0 | Status: SHIPPED | OUTPATIENT
Start: 2019-09-04 | End: 2022-04-25

## 2019-11-04 RX ORDER — MELOXICAM 15 MG/1
TABLET ORAL
Qty: 30 TABLET | Refills: 0 | OUTPATIENT
Start: 2019-11-04

## 2019-11-04 RX ORDER — ALLOPURINOL 100 MG/1
TABLET ORAL
Qty: 30 TABLET | Refills: 0 | OUTPATIENT
Start: 2019-11-04

## 2019-11-27 ENCOUNTER — OFFICE VISIT (OUTPATIENT)
Dept: ORTHOPEDIC SURGERY | Facility: CLINIC | Age: 69
End: 2019-11-27

## 2019-11-27 DIAGNOSIS — M17.0 PRIMARY OSTEOARTHRITIS OF KNEES, BILATERAL: ICD-10-CM

## 2019-11-27 DIAGNOSIS — R52 PAIN: Primary | ICD-10-CM

## 2019-11-27 PROCEDURE — 99212 OFFICE O/P EST SF 10 MIN: CPT | Performed by: NURSE PRACTITIONER

## 2019-11-27 PROCEDURE — 20610 DRAIN/INJ JOINT/BURSA W/O US: CPT | Performed by: NURSE PRACTITIONER

## 2019-11-27 RX ORDER — LIDOCAINE HYDROCHLORIDE 10 MG/ML
8 INJECTION, SOLUTION EPIDURAL; INFILTRATION; INTRACAUDAL; PERINEURAL
Status: COMPLETED | OUTPATIENT
Start: 2019-11-27 | End: 2019-11-27

## 2019-11-27 RX ORDER — TRIAMCINOLONE ACETONIDE 40 MG/ML
80 INJECTION, SUSPENSION INTRA-ARTICULAR; INTRAMUSCULAR
Status: COMPLETED | OUTPATIENT
Start: 2019-11-27 | End: 2019-11-27

## 2019-11-27 RX ADMIN — TRIAMCINOLONE ACETONIDE 80 MG: 40 INJECTION, SUSPENSION INTRA-ARTICULAR; INTRAMUSCULAR at 15:55

## 2019-11-27 RX ADMIN — LIDOCAINE HYDROCHLORIDE 8 ML: 10 INJECTION, SOLUTION EPIDURAL; INFILTRATION; INTRACAUDAL; PERINEURAL at 15:55

## 2019-11-27 NOTE — PROGRESS NOTES
Subjective:     Patient ID: Pato Charles is a 69 y.o. male.    Chief Complaint:  Follow-up primary osteoarthritis bilateral knees  History of Present Illness  Pato Charles returns to clinic for follow-up bilateral knee pain.  Right knee pain greater than that of the left increased pain noted transitional activities such as from seated standing attempting to walk, ambulating long distances, seated with activities of the deep flexion.  Denies presence of numbness or tingling bilateral lower extremities.  Has discontinued meloxicam only taking as needed.  Receive significant symptom relief with the gel injections happy with relief.  Denies other concerns present this time.       Social History     Occupational History   • Not on file   Tobacco Use   • Smoking status: Former Smoker     Last attempt to quit:      Years since quittin.9   • Smokeless tobacco: Never Used   Substance and Sexual Activity   • Alcohol use: Yes   • Drug use: No   • Sexual activity: Defer      No past medical history on file.  No past surgical history on file.    No family history on file.      Review of Systems   Constitutional: Negative for chills, diaphoresis, fever and unexpected weight change.   HENT: Negative for hearing loss, nosebleeds, sore throat and tinnitus.    Eyes: Negative for pain and visual disturbance.   Respiratory: Negative for cough, shortness of breath and wheezing.    Cardiovascular: Negative for chest pain and palpitations.   Gastrointestinal: Negative for abdominal pain, diarrhea, nausea and vomiting.   Endocrine: Negative for cold intolerance, heat intolerance and polydipsia.   Genitourinary: Negative for difficulty urinating, dysuria and hematuria.   Musculoskeletal: Positive for arthralgias and myalgias. Negative for joint swelling.   Skin: Negative for rash and wound.   Allergic/Immunologic: Negative for environmental allergies.   Neurological: Negative for dizziness, syncope and numbness.   Hematological: Does  not bruise/bleed easily.   Psychiatric/Behavioral: Negative for dysphoric mood and sleep disturbance. The patient is not nervous/anxious.            Objective:  Physical Exam    Vital signs reviewed.   General: No acute distress.  Eyes: conjunctiva clear; pupils equally round and reactive  ENT: external ears and nose atraumatic; oropharynx clear  CV: no peripheral edema  Resp: normal respiratory effort  Skin: no rashes or wounds; normal turgor  Psych: mood and affect appropriate; recent and remote memory intact    There were no vitals filed for this visit.  There were no vitals filed for this visit.  There is no height or weight on file to calculate BMI.      Right Knee Exam     Tenderness   The patient is experiencing tenderness in the medial joint line, lateral joint line and patella.    Range of Motion   Extension: 0   Flexion: 110     Tests   Priyanka:  Medial - positive Lateral - negative  Varus: negative Valgus: negative  Lachman:  Anterior - 1+      Patellar apprehension: positive    Other   Erythema: absent  Sensation: normal  Pulse: present  Swelling: moderate  Effusion: no effusion present    Comments:  Positive crepitus there are commission   positive active patellar compression test      Left Knee Exam     Tenderness   The patient is experiencing tenderness in the medial joint line and patella.    Range of Motion   Extension: 5   Flexion: 110     Tests   Priyanka:  Medial - positive Lateral - negative  Varus: negative Valgus: negative  Lachman:  Anterior - 1+    Posterior - negative  Drawer:  Anterior - negative       Patellar apprehension: negative    Other   Erythema: absent  Sensation: normal  Pulse: present  Swelling: mild  Effusion: no effusion present    Comments:  Positive crepitus throughout motion             Assessment:        1. Pain    2. Primary osteoarthritis of knees, bilateral           Plan:  1.  Discussed plan of care with patient.  Wish to proceed with corticosteroid injection  bilateral knees.  Plan to see him back in clinic in 3 months to repeat Visco supplementation injections bilateral knees.  He verbalized understanding of all information agrees with plan of care.  Denies other concerns that he has at this time.  Large Joint Arthrocentesis  Date/Time: 11/27/2019 3:55 PM  Consent given by: patient  Site marked: site marked  Timeout: Immediately prior to procedure a time out was called to verify the correct patient, procedure, equipment, support staff and site/side marked as required   Supporting Documentation  Indications: pain   Procedure Details  Location: knee (bilateral) - Knee joint: bilateral.  Preparation: Patient was prepped and draped in the usual sterile fashion  Needle size: 22 G  Approach: anterolateral  Medications administered: 8 mL lidocaine PF 1% 1 %; 80 mg triamcinolone acetonide 40 MG/ML  Patient tolerance: patient tolerated the procedure well with no immediate complications            Orders:  Orders Placed This Encounter   Procedures   • Large Joint Arthrocentesis       Dictated utilizing Dragon dictation

## 2020-01-09 ENCOUNTER — OFFICE VISIT (OUTPATIENT)
Dept: ORTHOPEDIC SURGERY | Facility: CLINIC | Age: 70
End: 2020-01-09

## 2020-01-09 DIAGNOSIS — M17.0 PRIMARY OSTEOARTHRITIS OF KNEES, BILATERAL: Primary | ICD-10-CM

## 2020-01-09 PROCEDURE — 99213 OFFICE O/P EST LOW 20 MIN: CPT | Performed by: NURSE PRACTITIONER

## 2020-01-09 RX ORDER — AMLODIPINE BESYLATE 10 MG/1
TABLET ORAL DAILY
Refills: 6 | COMMUNITY
Start: 2019-11-13 | End: 2022-04-25 | Stop reason: SDUPTHER

## 2020-01-09 RX ORDER — METHYLPREDNISOLONE 4 MG/1
TABLET ORAL
Qty: 21 TABLET | Refills: 0 | Status: SHIPPED | OUTPATIENT
Start: 2020-01-09 | End: 2020-01-09 | Stop reason: SDUPTHER

## 2020-01-09 RX ORDER — METHYLPREDNISOLONE 4 MG/1
TABLET ORAL
Qty: 21 TABLET | Refills: 0 | Status: SHIPPED | OUTPATIENT
Start: 2020-01-09 | End: 2020-11-13

## 2020-01-09 NOTE — PROGRESS NOTES
Subjective:     Patient ID: Pato Charles is a 70 y.o. male.    Chief Complaint:  Follow-up primary osteoarthritis bilateral knees   History of Present Illness  Pato Charles returns to clinic for follow-up bilateral knee pain.  Right knee pain greater than that of left increased pain noted with activities involving deep flexion.  Received corticosteroid injections bilateral knees last 2019 is due for Visco supplementation injections in the next few weeks however is a little too soon.  Is not currently taking any anti-inflammatory medications for symptom relief secondary to hypertension.  Maximal tenderness present bilateral knees and medial compartment as well as the anterior aspect again right knee pain greater than the left.  Increased pain noted with transitional activities such as seated standing attempting to walk, ambulating long distance caught, standing for extended periods of time.  He is able to get comfortable in a seated position with the knee slightly flexed but is interfering with his day-to-day activities.  Denies that the knees are locking, catching or giving out on him.  He is not ready to proceed with any total joint knee replacement surgery at this time.  Denies presence of numbness or tingling bilateral lower extremities.  Denies other concerns present this time.       Social History     Occupational History   • Not on file   Tobacco Use   • Smoking status: Former Smoker     Last attempt to quit:      Years since quittin.0   • Smokeless tobacco: Never Used   Substance and Sexual Activity   • Alcohol use: Yes   • Drug use: No   • Sexual activity: Defer      No past medical history on file.  No past surgical history on file.    No family history on file.      Review of Systems   Constitutional: Negative for chills, diaphoresis, fever and unexpected weight change.   HENT: Negative for hearing loss, nosebleeds, sore throat and tinnitus.    Eyes: Negative for pain and visual disturbance.    Respiratory: Negative for cough, shortness of breath and wheezing.    Cardiovascular: Negative for chest pain and palpitations.   Gastrointestinal: Negative for abdominal pain, diarrhea, nausea and vomiting.   Endocrine: Negative for cold intolerance, heat intolerance and polydipsia.   Genitourinary: Negative for difficulty urinating, dysuria and hematuria.   Musculoskeletal: Positive for arthralgias and myalgias. Negative for joint swelling.   Skin: Negative for rash and wound.   Allergic/Immunologic: Negative for environmental allergies.   Neurological: Negative for dizziness, syncope and numbness.   Hematological: Does not bruise/bleed easily.   Psychiatric/Behavioral: Negative for dysphoric mood and sleep disturbance. The patient is not nervous/anxious.            Objective:  Physical Exam    General: No acute distress.  Eyes: conjunctiva clear; pupils equally round and reactive  ENT: external ears and nose atraumatic; oropharynx clear  CV: no peripheral edema  Resp: normal respiratory effort  Skin: no rashes or wounds; normal turgor  Psych: mood and affect appropriate; recent and remote memory intact    There were no vitals filed for this visit.  There were no vitals filed for this visit.  There is no height or weight on file to calculate BMI.      Ortho Exam     Right Knee Exam      Tenderness   The patient is experiencing tenderness in the medial joint line, lateral joint line, patella, posterior joint line     Range of Motion   Extension: 5   Flexion: 110      Tests   Priyanka:  Medial - positive Lateral - negative  Varus: negative Valgus: negative  Lachman:  Anterior - 1+      Patellar apprehension: positive     Other   Erythema: absent  Sensation: normal  Pulse: present  Swelling: moderate  Effusion: no effusion present     Comments:  Positive crepitus there are commission   positive active patellar compression test       Left Knee Exam      Tenderness   The patient is experiencing tenderness in the  medial joint line and patella.     Range of Motion   Extension: 5   Flexion: 110      Tests   Priyanka:  Medial - positive Lateral - negative  Varus: negative Valgus: negative  Lachman:  Anterior - 1+    Posterior - negative  Drawer:  Anterior - negative       Patellar apprehension: negative     Other   Erythema: absent  Sensation: normal  Pulse: present  Swelling: mild  Effusion: no effusion present     Comments:  Positive crepitus throughout motion    Assessment:        1. Primary osteoarthritis of knees, bilateral           Plan:  1.  Discussed plan of care with patient.  We will start Medrol Dosepak for the next 6 days encouraged acetaminophen.  We will plan to see him back in clinic on January 27, 2021 we are able to proceed with Visco supplementation injections bilateral knees, single series per patient preference.  He verbalized understanding of all information agrees with plan of care.  Appointment made for him prior to leaving.  Denies other concerns he is at this time.    Orders:  No orders of the defined types were placed in this encounter.    Dictated utilizing Dragon dictation

## 2020-01-28 ENCOUNTER — TELEPHONE (OUTPATIENT)
Dept: ORTHOPEDIC SURGERY | Facility: CLINIC | Age: 70
End: 2020-01-28

## 2020-01-29 ENCOUNTER — CLINICAL SUPPORT (OUTPATIENT)
Dept: ORTHOPEDIC SURGERY | Facility: CLINIC | Age: 70
End: 2020-01-29

## 2020-01-29 DIAGNOSIS — R52 PAIN: Primary | ICD-10-CM

## 2020-01-29 DIAGNOSIS — M17.0 PRIMARY OSTEOARTHRITIS OF KNEES, BILATERAL: ICD-10-CM

## 2020-01-29 PROCEDURE — 20610 DRAIN/INJ JOINT/BURSA W/O US: CPT | Performed by: NURSE PRACTITIONER

## 2020-01-29 NOTE — PROGRESS NOTES
Large Joint Arthrocentesis  Date/Time: 1/29/2020 3:16 PM  Consent given by: patient  Site marked: site marked  Timeout: Immediately prior to procedure a time out was called to verify the correct patient, procedure, equipment, support staff and site/side marked as required   Supporting Documentation  Indications: pain   Procedure Details  Location: knee (bilateral) - Knee joint: bilateral.  Preparation: Patient was prepped and draped in the usual sterile fashion  Needle size: 22 G  Approach: anterolateral  Medications administered: 48 mg hylan 48 MG/6ML  Patient tolerance: patient tolerated the procedure well with no immediate complications      Patient presents to clinic today for bilateral knee viscosupplement injections.  This is the single injection of the series .  I explained details of injections as well as risks, benefits and alternatives with the patient today, had all questions answered, wished to proceed with injections.  I will see patient back in 6 weeks for re-evaluation. Patient was instructed to watch for signs or symptoms of infection including redness, swelling, warmth to the touch, or significant increased pain and to contact our office immediately if any of these issues were noted.

## 2020-03-30 ENCOUNTER — OFFICE VISIT (OUTPATIENT)
Dept: ORTHOPEDIC SURGERY | Facility: CLINIC | Age: 70
End: 2020-03-30

## 2020-03-30 DIAGNOSIS — M17.0 PRIMARY OSTEOARTHRITIS OF KNEES, BILATERAL: Primary | ICD-10-CM

## 2020-03-30 PROCEDURE — 99212 OFFICE O/P EST SF 10 MIN: CPT | Performed by: NURSE PRACTITIONER

## 2020-03-30 PROCEDURE — 20610 DRAIN/INJ JOINT/BURSA W/O US: CPT | Performed by: NURSE PRACTITIONER

## 2020-03-30 RX ORDER — LIDOCAINE HYDROCHLORIDE 10 MG/ML
8 INJECTION, SOLUTION EPIDURAL; INFILTRATION; INTRACAUDAL; PERINEURAL
Status: COMPLETED | OUTPATIENT
Start: 2020-03-30 | End: 2020-03-30

## 2020-03-30 RX ORDER — TRIAMCINOLONE ACETONIDE 40 MG/ML
80 INJECTION, SUSPENSION INTRA-ARTICULAR; INTRAMUSCULAR
Status: COMPLETED | OUTPATIENT
Start: 2020-03-30 | End: 2020-03-30

## 2020-03-30 RX ADMIN — TRIAMCINOLONE ACETONIDE 80 MG: 40 INJECTION, SUSPENSION INTRA-ARTICULAR; INTRAMUSCULAR at 12:10

## 2020-03-30 RX ADMIN — LIDOCAINE HYDROCHLORIDE 8 ML: 10 INJECTION, SOLUTION EPIDURAL; INFILTRATION; INTRACAUDAL; PERINEURAL at 12:10

## 2020-03-30 NOTE — PROGRESS NOTES
Subjective:     Patient ID: Pato Charles is a 70 y.o. male.    Chief Complaint:    History of Present Illness  Pato Charles       Social History     Occupational History   • Not on file   Tobacco Use   • Smoking status: Former Smoker     Last attempt to quit:      Years since quittin.2   • Smokeless tobacco: Never Used   Substance and Sexual Activity   • Alcohol use: Yes   • Drug use: No   • Sexual activity: Defer      No past medical history on file.  No past surgical history on file.    No family history on file.      Review of Systems   Constitutional: Negative for chills, diaphoresis, fever and unexpected weight change.   HENT: Negative for hearing loss, nosebleeds, sore throat and tinnitus.    Eyes: Negative for pain and visual disturbance.   Respiratory: Negative for cough, shortness of breath and wheezing.    Cardiovascular: Negative for chest pain and palpitations.   Gastrointestinal: Negative for abdominal pain, diarrhea, nausea and vomiting.   Endocrine: Negative for cold intolerance, heat intolerance and polydipsia.   Genitourinary: Negative for difficulty urinating, dysuria and hematuria.   Musculoskeletal: Positive for arthralgias and myalgias. Negative for joint swelling.   Skin: Negative for rash and wound.   Allergic/Immunologic: Negative for environmental allergies.   Neurological: Negative for dizziness, syncope and numbness.   Hematological: Does not bruise/bleed easily.   Psychiatric/Behavioral: Negative for dysphoric mood and sleep disturbance. The patient is not nervous/anxious.            Objective:  There were no vitals filed for this visit.  There were no vitals filed for this visit.  There is no height or weight on file to calculate BMI.      Ortho Exam         Imaging:    Assessment:      No diagnosis found.       Plan:    Orders:  No orders of the defined types were placed in this encounter.      Medications:  No orders of the defined types were placed in this  encounter.      Followup:  No follow-ups on file.    There are no diagnoses linked to this encounter.      I ordered and reviewed the NICO today.     Dictated utilizing Dragon dictation

## 2020-03-30 NOTE — PROGRESS NOTES
Subjective:     Patient ID: Pato Charles is a 70 y.o. male.    Chief Complaint:  Follow-up primary osteoarthritis bilateral knees  History of Present Illness  Pato Charles returns to clinic for follow-up of bilateral knees.  Receive Visco supplementation injections, single series 2020.  Presents back to clinic with swelling pain greater right than left.  Increased pain noted with transitional activity such as simply to standing attempting to walk, ambulating long distances, standing for extended periods of time.  Increased pain noted with ascending descending steps.  Denies that the knees are locking, positive for catching and feeling as if they are going to give out on him.  Denies pain radiating to the groin or to the lateral aspect of the hip.  Denies presence of numbness or tingling bilateral lower extremities.  Denies other concerns present this time.    Social History     Occupational History   • Not on file   Tobacco Use   • Smoking status: Former Smoker     Last attempt to quit:      Years since quittin.2   • Smokeless tobacco: Never Used   Substance and Sexual Activity   • Alcohol use: Yes   • Drug use: No   • Sexual activity: Defer      No past medical history on file.  No past surgical history on file.    No family history on file.      Review of Systems   Constitutional: Negative for chills, diaphoresis, fever and unexpected weight change.   HENT: Negative for hearing loss, nosebleeds, sore throat and tinnitus.    Eyes: Negative for pain and visual disturbance.   Respiratory: Negative for cough, shortness of breath and wheezing.    Cardiovascular: Negative for chest pain and palpitations.   Gastrointestinal: Negative for abdominal pain, diarrhea, nausea and vomiting.   Endocrine: Negative for cold intolerance, heat intolerance and polydipsia.   Genitourinary: Negative for difficulty urinating, dysuria and hematuria.   Musculoskeletal: Positive for gait problem and joint swelling. Negative for  arthralgias.   Skin: Negative for rash and wound.   Allergic/Immunologic: Negative for environmental allergies.   Neurological: Negative for dizziness, syncope and numbness.   Hematological: Does not bruise/bleed easily.   Psychiatric/Behavioral: Negative for dysphoric mood and sleep disturbance. The patient is not nervous/anxious.            Objective:  Physical Exam    General: No acute distress.  Eyes: conjunctiva clear; pupils equally round and reactive  ENT: external ears and nose atraumatic; oropharynx clear  CV: no peripheral edema  Resp: normal respiratory effort  Skin: no rashes or wounds; normal turgor  Psych: mood and affect appropriate; recent and remote memory intact    There were no vitals filed for this visit.  There were no vitals filed for this visit.  There is no height or weight on file to calculate BMI.      Right Knee Exam     Tenderness   The patient is experiencing tenderness in the medial joint line and patella.    Range of Motion   Extension: 5   Flexion: 120     Tests   Priyanka:  Medial - positive   Lachman:  Anterior - 1+    Posterior - negative  Drawer:  Anterior - negative    Posterior - negative  Patellar apprehension: positive    Other   Erythema: absent  Sensation: normal  Pulse: present  Swelling: moderate  Effusion: effusion present    Comments:  Positive active patellar compression test  Negative calf tenderness positive crepitus throughout arc of motion        Left Knee Exam     Tenderness   The patient is experiencing tenderness in the medial joint line and patella.    Range of Motion   Extension: 5   Flexion: 120     Tests   Priyanka:  Medial - positive   Lachman:  Anterior - 1+    Posterior - negative  Drawer:  Anterior - negative     Posterior - negative  Patellar apprehension: positive    Other   Erythema: absent  Sensation: normal  Pulse: present  Swelling: moderate    Comments:  Positive active patellar compression test  Negative calf tenderness positive crepitus  throughout arc of motion             Assessment:        1. Primary osteoarthritis of knees, bilateral           Plan:  1.  Discussed plan of care with patient.  Wish to proceed with arthrocentesis corticosteroid injection right knee, corticosteroid injection left knee.  Discussed with him we will attempt arthrocentesis.  Plan to see him back in clinic in approximately 3 months to reevaluate.  At that time we can proceed with corticosteroid injections.  He is not due for Visco supplementation injections until after July 30, 2020.  He verbalized understanding of information agrees with plan of care.  Denies other concerns present time.    Large Joint Arthrocentesis  Date/Time: 3/30/2020 12:10 PM  Consent given by: patient  Site marked: site marked  Timeout: Immediately prior to procedure a time out was called to verify the correct patient, procedure, equipment, support staff and site/side marked as required   Supporting Documentation  Indications: pain   Procedure Details  Location: knee - Knee joint: Bilateral.  Preparation: Patient was prepped and draped in the usual sterile fashion  Needle size: 22 G  Approach: superolateral   Medications administered: 8 mL lidocaine PF 1% 1 %; 80 mg triamcinolone acetonide 40 MG/ML  Aspirate amount: 1 mL  Aspirate: clear and yellow  Patient tolerance: patient tolerated the procedure well with no immediate complications            Orders:  Orders Placed This Encounter   Procedures   • Large Joint Arthrocentesis       Medications:  No orders of the defined types were placed in this encounter.      Followup:  No follow-ups on file.    Pato was seen today for follow-up and follow-up.    Diagnoses and all orders for this visit:    Primary osteoarthritis of knees, bilateral  -     Large Joint Arthrocentesis      Dictated utilizing Dragon dictation

## 2020-11-13 ENCOUNTER — OFFICE VISIT (OUTPATIENT)
Dept: ORTHOPEDIC SURGERY | Facility: CLINIC | Age: 70
End: 2020-11-13

## 2020-11-13 VITALS — BODY MASS INDEX: 29.45 KG/M2 | WEIGHT: 150 LBS | HEIGHT: 60 IN

## 2020-11-13 DIAGNOSIS — M17.0 PRIMARY OSTEOARTHRITIS OF KNEES, BILATERAL: Primary | ICD-10-CM

## 2020-11-13 PROCEDURE — 99214 OFFICE O/P EST MOD 30 MIN: CPT | Performed by: NURSE PRACTITIONER

## 2020-11-13 PROCEDURE — 20610 DRAIN/INJ JOINT/BURSA W/O US: CPT | Performed by: NURSE PRACTITIONER

## 2020-11-13 NOTE — PROGRESS NOTES
Subjective:     Patient ID: Pato Charles is a 70 y.o. male.    Chief Complaint:  Follow-up DJD bilateral knees  History of Present Illness  Pato Charles returns to clinic for follow-up bilateral knees is done extremely well with corticosteroid injection as well as viscosupplementation injections in the past denies that he is experiencing significant pain at today's visit he is experiencing the feeling of weakness and feeling as if his knees are getting give out on him.  Increased pain noted with long periods of ambulatory activities, activities involving deep flexion both knees equally is sore.  Denies presence of numbness or tingling bilateral lower extremities.  Denies other concerns present at this time.    Social History     Occupational History   • Not on file   Tobacco Use   • Smoking status: Former Smoker     Quit date:      Years since quittin.8   • Smokeless tobacco: Never Used   Substance and Sexual Activity   • Alcohol use: Yes   • Drug use: No   • Sexual activity: Defer      History reviewed. No pertinent past medical history.  History reviewed. No pertinent surgical history.    History reviewed. No pertinent family history.      Review of Systems   Constitutional: Negative for chills, diaphoresis, fever and unexpected weight change.   HENT: Negative for hearing loss, nosebleeds, sore throat and tinnitus.    Eyes: Negative for pain and visual disturbance.   Respiratory: Negative for cough, shortness of breath and wheezing.    Cardiovascular: Negative for chest pain and palpitations.   Gastrointestinal: Negative for abdominal pain, diarrhea, nausea and vomiting.   Endocrine: Negative for cold intolerance, heat intolerance and polydipsia.   Genitourinary: Negative for difficulty urinating, dysuria and hematuria.   Musculoskeletal: Positive for arthralgias and myalgias. Negative for joint swelling.   Skin: Negative for rash and wound.   Allergic/Immunologic: Negative for environmental allergies.  "  Neurological: Negative for dizziness, syncope and numbness.   Hematological: Does not bruise/bleed easily.   Psychiatric/Behavioral: Negative for dysphoric mood and sleep disturbance. The patient is not nervous/anxious.            Objective:  Physical Exam    General: No acute distress.  Eyes: conjunctiva clear; pupils equally round and reactive  ENT: external ears and nose atraumatic; oropharynx clear  CV: no peripheral edema  Resp: normal respiratory effort  Skin: no rashes or wounds; normal turgor  Psych: mood and affect appropriate; recent and remote memory intact    Vitals:    11/13/20 1323   Weight: 68 kg (150 lb)   Height: 152.4 cm (60\")         11/13/20  1323   Weight: 68 kg (150 lb)     Body mass index is 29.29 kg/m².      Right Knee Exam     Tenderness   The patient is experiencing tenderness in the medial joint line and patella.    Range of Motion   Extension: 0   Flexion: 130     Tests   Priyanka:  Medial - negative Lateral - negative  Varus: negative Valgus: negative  Lachman:  Anterior - 1+    Posterior - negative  Drawer:  Anterior - negative    Posterior - negative  Patellar apprehension: negative    Other   Erythema: absent  Scars: absent  Sensation: normal  Pulse: present  Swelling: moderate    Comments:  Positive crepitus throughout arc of motion  Negative active patellar compression test      Left Knee Exam     Tenderness   The patient is experiencing tenderness in the medial joint line and patella.    Range of Motion   Extension: 0   Flexion: 130     Tests   Priyanka:  Medial - negative Lateral - negative  Varus: negative Valgus: negative  Lachman:  Anterior - 1+    Posterior - negative  Drawer:  Anterior - negative     Posterior - negative  Patellar apprehension: negative    Other   Erythema: absent  Sensation: normal  Pulse: present  Swelling: mild    Comments:  Positive crepitus throughout arc of motion  Negative active patellar compression test           Assessment:        1. Primary " osteoarthritis of knees, bilateral           Plan:  1.  Discussed plan of care with patient.  We will proceed with viscosupplementation injections bilateral knees.  Plan to see him back in clinic 3 to 6 months as needed.  He verbalized understanding of all information agrees with plan of care.  Denies other concerns present time.  Large Joint Arthrocentesis  Date/Time: 11/13/2020 1:52 PM  Consent given by: patient  Site marked: site marked  Timeout: Immediately prior to procedure a time out was called to verify the correct patient, procedure, equipment, support staff and site/side marked as required   Supporting Documentation  Indications: pain   Procedure Details  Location: knee - Knee joint: BILATERAL KNEE.  Preparation: Patient was prepped and draped in the usual sterile fashion  Needle size: 22 G  Approach: superior (LATERAL)  Medications administered: 30 mg Cross-Linked Hyaluronate 30 MG/3ML  Patient tolerance: patient tolerated the procedure well with no immediate complications      GEL ONE Cross-Linked Hyaluronate Buy and Bill Office Provided  JEREMIAH:[id number]  LOT:[id number]  EXP:09.27.2022  NDC:[address]-46       Orders:  Orders Placed This Encounter   Procedures   • Large Joint Arthrocentesis       Medications:  No orders of the defined types were placed in this encounter.      Followup:  No follow-ups on file.    Diagnoses and all orders for this visit:    1. Primary osteoarthritis of knees, bilateral (Primary)    Other orders  -     Large Joint Arthrocentesis      Dictated utilizing Dragon dictation

## 2021-04-26 ENCOUNTER — OFFICE VISIT (OUTPATIENT)
Dept: ORTHOPEDIC SURGERY | Facility: CLINIC | Age: 71
End: 2021-04-26

## 2021-04-26 VITALS — WEIGHT: 150 LBS | BODY MASS INDEX: 29.45 KG/M2 | HEIGHT: 60 IN

## 2021-04-26 DIAGNOSIS — M17.0 PRIMARY OSTEOARTHRITIS OF KNEES, BILATERAL: Primary | ICD-10-CM

## 2021-04-26 PROCEDURE — 99214 OFFICE O/P EST MOD 30 MIN: CPT | Performed by: NURSE PRACTITIONER

## 2021-04-26 PROCEDURE — 73562 X-RAY EXAM OF KNEE 3: CPT | Performed by: NURSE PRACTITIONER

## 2021-04-26 PROCEDURE — 20610 DRAIN/INJ JOINT/BURSA W/O US: CPT | Performed by: NURSE PRACTITIONER

## 2021-04-26 RX ORDER — PREDNISONE 10 MG/1
TABLET ORAL
Qty: 21 TABLET | Refills: 0 | Status: SHIPPED | OUTPATIENT
Start: 2021-04-26 | End: 2021-08-02 | Stop reason: ALTCHOICE

## 2021-04-26 NOTE — PROGRESS NOTES
Subjective:     Patient ID: Pato Charles is a 71 y.o. male.    Chief Complaint:  DJD bilateral knees, worsening of pain and swelling  History of Present Illness  Pato Charles returns to clinic for follow-up bilateral knees.  Symptoms worse is ambulating with a limp pain worse at the left knee however also present at the right knee.  Received corticosteroid injections in the past is also receive viscosupplementation injections in the past is done very well with both he has noted significant amount of swelling over the last several months.  Maximal tenderness present in the anterior aspect of bilateral knees as well as the medial joint line.  He does feel as if the knee is getting give out on him rates discomfort a 9-10 out of a 10 aching throbbing sharp shooting in nature.  Denies other concerns present time.    Social History     Occupational History   • Not on file   Tobacco Use   • Smoking status: Former Smoker     Quit date:      Years since quittin.3   • Smokeless tobacco: Never Used   Vaping Use   • Vaping Use: Never used   Substance and Sexual Activity   • Alcohol use: Yes   • Drug use: No   • Sexual activity: Defer      History reviewed. No pertinent past medical history.  History reviewed. No pertinent surgical history.    History reviewed. No pertinent family history.      Review of Systems   Constitutional: Negative for chills, diaphoresis, fever and unexpected weight change.   HENT: Negative for hearing loss, nosebleeds, sore throat and tinnitus.    Eyes: Negative for pain and visual disturbance.   Respiratory: Negative for cough, shortness of breath and wheezing.    Cardiovascular: Negative for chest pain and palpitations.   Gastrointestinal: Negative for abdominal pain, diarrhea, nausea and vomiting.   Endocrine: Negative for cold intolerance, heat intolerance and polydipsia.   Genitourinary: Negative for difficulty urinating, dysuria and hematuria.   Musculoskeletal: Positive for arthralgias and  "myalgias. Negative for joint swelling.   Skin: Negative for rash and wound.   Allergic/Immunologic: Negative for environmental allergies.   Neurological: Negative for dizziness, syncope and numbness.   Hematological: Does not bruise/bleed easily.   Psychiatric/Behavioral: Negative for dysphoric mood and sleep disturbance. The patient is not nervous/anxious.            Objective:  Physical Exam    General: No acute distress.  Eyes: conjunctiva clear; pupils equally round and reactive  ENT: external ears and nose atraumatic; oropharynx clear  CV: no peripheral edema  Resp: normal respiratory effort  Skin: no rashes or wounds; normal turgor  Psych: mood and affect appropriate; recent and remote memory intact    Vitals:    04/26/21 1527   Weight: 68 kg (150 lb)   Height: 152.4 cm (60\")         04/26/21  1527   Weight: 68 kg (150 lb)     Body mass index is 29.29 kg/m².      Right Knee Exam     Tenderness   The patient is experiencing tenderness in the medial joint line, patella and lateral joint line.    Range of Motion   Extension: 5   Flexion: 110     Tests   Lachman:  Anterior - 1+      Drawer:  Anterior - negative      Patellar apprehension: positive    Other   Erythema: absent  Sensation: normal  Pulse: present  Swelling: severe  Effusion: effusion present    Comments:  Positive crepitus throughout arc of motion  Positive active patellar compression test      Left Knee Exam     Tenderness   The patient is experiencing tenderness in the medial joint line, patella and lateral joint line.    Range of Motion   Extension: 5   Flexion: 110     Tests   Lachman:  Anterior - 1+      Drawer:  Anterior - negative       Patellar apprehension: positive    Other   Erythema: absent  Sensation: normal  Pulse: present  Swelling: severe  Effusion: effusion present    Comments:  Positive crepitus throughout arc of motion  Positive active patellar compression test                 Imaging:  Bilateral Knee X-Ray  Indication: Pain    AP, " Lateral, and [place] views    Findings:  No fracture  No bony lesion  Normal soft tissues  Normal joint spaces  Advanced t osteoarthritis with bone-on-bone articulation at the medial joint line, moderate to advanced  patellofemoral osteoarthritis with osteophytes noted in all 3 compartments  prior studies were available for comparison.    Assessment:        1. Primary osteoarthritis of knees, bilateral           Plan:  1.  Discussed plan of care with patient.  Wishes to proceed with aspiration Visco injection bilateral knees.  We will plan to see him back in clinic 6 months in 1 day to repeat Visco injections bilateral knees.  Discussed if he needs anything between now and follow-up will plan to see him back in clinic.  Will start prednisone taper for the swelling and the pain he is experiencing.  All question answered.  Large Joint Arthrocentesis  Date/Time: 2021 4:14 PM  Consent given by: patient  Site marked: site marked  Timeout: Immediately prior to procedure a time out was called to verify the correct patient, procedure, equipment, support staff and site/side marked as required   Supporting Documentation  Indications: pain   Procedure Details  Location: knee (bilateral) -   Preparation: Patient was prepped and draped in the usual sterile fashion  Needle size: 18 G  Approach: superior lateral.  Medications administered: 30 mg Cross-Linked Hyaluronate 30 MG/3ML  Aspirate amount (ml): 16 ml left knee, 3 ml right knee.  Aspirate: serous  Patient tolerance: patient tolerated the procedure well with no immediate complications            Orders:  Orders Placed This Encounter   Procedures   • Large Joint Arthrocentesis   • XR Knee 3 View Bilateral       Medications:  New Medications Ordered This Visit   Medications   • predniSONE (DELTASONE) 10 MG tablet     Si tablets day in am 1, 5 tablets with food day 2, 4 tablets in am day 3, 3 tablets with food in am 4, 2 tablets in am day 5, 1 tablet in am day 6      Dispense:  21 tablet     Refill:  0       Followup:  No follow-ups on file.    Diagnoses and all orders for this visit:    1. Primary osteoarthritis of knees, bilateral (Primary)  -     XR Knee 3 View Bilateral    Other orders  -     Large Joint Arthrocentesis  -     predniSONE (DELTASONE) 10 MG tablet; 6 tablets day in am 1, 5 tablets with food day 2, 4 tablets in am day 3, 3 tablets with food in am 4, 2 tablets in am day 5, 1 tablet in am day 6  Dispense: 21 tablet; Refill: 0          Dictated utilizing Dragon dictation

## 2021-08-02 ENCOUNTER — CLINICAL SUPPORT (OUTPATIENT)
Dept: ORTHOPEDIC SURGERY | Facility: CLINIC | Age: 71
End: 2021-08-02

## 2021-08-02 VITALS — WEIGHT: 150 LBS | HEIGHT: 60 IN | BODY MASS INDEX: 29.45 KG/M2

## 2021-08-02 DIAGNOSIS — M25.461 EFFUSION OF RIGHT KNEE: ICD-10-CM

## 2021-08-02 DIAGNOSIS — M17.0 PRIMARY OSTEOARTHRITIS OF KNEES, BILATERAL: Primary | ICD-10-CM

## 2021-08-02 PROCEDURE — 20610 DRAIN/INJ JOINT/BURSA W/O US: CPT | Performed by: NURSE PRACTITIONER

## 2021-08-02 RX ADMIN — TRIAMCINOLONE ACETONIDE 80 MG: 40 INJECTION, SUSPENSION INTRA-ARTICULAR; INTRAMUSCULAR at 09:45

## 2021-08-02 RX ADMIN — LIDOCAINE HYDROCHLORIDE 8 ML: 10 INJECTION, SOLUTION EPIDURAL; INFILTRATION; INTRACAUDAL; PERINEURAL at 09:45

## 2021-08-02 NOTE — PROGRESS NOTES
Procedure   Large Joint Arthrocentesis  Date/Time: 8/2/2021 9:45 AM  Consent given by: patient  Site marked: site marked  Timeout: Immediately prior to procedure a time out was called to verify the correct patient, procedure, equipment, support staff and site/side marked as required   Supporting Documentation  Indications: pain   Procedure Details  Location: knee - Knee joint: BILATERAL KNEE.  Preparation: Patient was prepped and draped in the usual sterile fashion  Needle gauge: R 18G L 22G.  Approach: superior (lateral )  Medications administered: 80 mg triamcinolone acetonide 40 MG/ML; 8 mL lidocaine PF 1% 1 %  Aspirate amount (ml): 9CC RIGHT KNEE.  Aspirate: serous  Patient tolerance: patient tolerated the procedure well with no immediate complications         CC: Follow-up DJD bilateral knees    HPI: Presents to clinic today via window walk-in with like to be evaluated for bilateral knee pain and swelling he is experiencing.  He is received viscosupplementation injections in the past last received April 26, 2021 with somewhat reported relief pain swelling has returned.  Maximal tenderness present the medial joint line bilateral knees is having extreme difficulty ambulating is have his young daughter with him to assist with ambulation.  Denies other concerns present time.    Bilateral knee exam:   Range of motion 5 degrees to 100 degrees  1+ anterior Lachman  Quad strength 4/5  Positive sensation light touch all distributions bilateral lower extremities  Severe swelling right knee, moderate swelling left knee  Positive effusion right knee  Positive crepitus throughout arc of motion    Assessment: DJD bilateral knees  Right knee effusion    Plan:  1.  Discussed plan of care with patient.  Wishes to proceed with aspiration corticosteroid injection of right knee, corticosteroid injection only at the left knee.  He does verbalize that he would like to proceed with total knee arthroplasty.  Currently being treated  primary care at [facility].  Discussed with patient to continue monitoring his glucose.  Denies any recent lab work.  Denies other concerns at this time.

## 2021-08-05 RX ORDER — TRIAMCINOLONE ACETONIDE 40 MG/ML
80 INJECTION, SUSPENSION INTRA-ARTICULAR; INTRAMUSCULAR
Status: COMPLETED | OUTPATIENT
Start: 2021-08-02 | End: 2021-08-02

## 2021-08-05 RX ORDER — LIDOCAINE HYDROCHLORIDE 10 MG/ML
8 INJECTION, SOLUTION EPIDURAL; INFILTRATION; INTRACAUDAL; PERINEURAL
Status: COMPLETED | OUTPATIENT
Start: 2021-08-02 | End: 2021-08-02

## 2022-04-25 ENCOUNTER — OFFICE VISIT (OUTPATIENT)
Dept: FAMILY MEDICINE CLINIC | Facility: CLINIC | Age: 72
End: 2022-04-25

## 2022-04-25 VITALS
HEART RATE: 71 BPM | WEIGHT: 162 LBS | TEMPERATURE: 98.2 F | OXYGEN SATURATION: 98 % | BODY MASS INDEX: 31.8 KG/M2 | DIASTOLIC BLOOD PRESSURE: 80 MMHG | SYSTOLIC BLOOD PRESSURE: 168 MMHG | HEIGHT: 60 IN

## 2022-04-25 DIAGNOSIS — Z11.59 ENCOUNTER FOR HEPATITIS C SCREENING TEST FOR LOW RISK PATIENT: ICD-10-CM

## 2022-04-25 DIAGNOSIS — I10 ESSENTIAL (PRIMARY) HYPERTENSION: ICD-10-CM

## 2022-04-25 DIAGNOSIS — Z13.220 SCREENING, LIPID: Primary | ICD-10-CM

## 2022-04-25 DIAGNOSIS — M17.0 PRIMARY OSTEOARTHRITIS OF BOTH KNEES: ICD-10-CM

## 2022-04-25 DIAGNOSIS — N18.30 STAGE 3 CHRONIC KIDNEY DISEASE, UNSPECIFIED WHETHER STAGE 3A OR 3B CKD: ICD-10-CM

## 2022-04-25 DIAGNOSIS — R35.1 NOCTURIA: ICD-10-CM

## 2022-04-25 DIAGNOSIS — Z12.5 SCREENING FOR MALIGNANT NEOPLASM OF PROSTATE: ICD-10-CM

## 2022-04-25 DIAGNOSIS — Z12.11 SCREEN FOR COLON CANCER: ICD-10-CM

## 2022-04-25 DIAGNOSIS — Z87.39 HISTORY OF GOUT: ICD-10-CM

## 2022-04-25 DIAGNOSIS — Z13.228 SCREENING FOR METABOLIC DISORDER: ICD-10-CM

## 2022-04-25 PROCEDURE — 99214 OFFICE O/P EST MOD 30 MIN: CPT | Performed by: NURSE PRACTITIONER

## 2022-04-25 RX ORDER — AMLODIPINE BESYLATE 10 MG/1
10 TABLET ORAL DAILY
Qty: 90 TABLET | Refills: 1 | Status: SHIPPED | OUTPATIENT
Start: 2022-04-25 | End: 2022-09-27

## 2022-04-25 NOTE — PROGRESS NOTES
Subjective   Pato Charles is a 72 y.o. male.     History of Present Illness   Patient is new to this provider and practice.  He is here to establish primary care today.  Patient is following up on chronic illnesses.  He needs medication refills.    Patient presents with history of hypertension X years.  He was previously managed on amlodipine 10 qd, has been off 2 weeks.  Patient denies chest pain, palpitations, headaches, vision changes or leg swelling.  Patient is due blood work.  Patient does have history of chronic CKD.  On chart review, 12-, he has had renal ultrasound which showed bilateral small cysts.  Mild renal cortical echogenicity.  No calculi or hydronephrosis.  No recent renal labs available in chart.    Patient presents with urinary frequency and nocturia for past several months, no history of prostate cancer in family, no PSA screen.  Due for labs today.  Patient has also been off of his blood pressure medicine for past 2 weeks.    Patient presents with chronic osteoarthritis X years.  He has been managed in the past for bilateral knee pain with steroid injections and visco .on chart review, last injections 2019.  He reports pain has improved with ice, sometimes ibuprofen helps. Has tried topical rubs.  Patient does have a history of gout, he was previously on allopurinol daily.  He has not been on this for over 2 weeks.  He denies daily drinking or high purine diet.  He has a history of CKD.    The following portions of the patient's history were reviewed and updated as appropriate: allergies, current medications, past family history, past medical history, past social history, past surgical history and problem list.    Review of Systems   Constitutional: Negative for activity change and unexpected weight loss.   HENT: Negative for congestion.    Eyes: Negative for blurred vision and visual disturbance.   Respiratory: Negative for cough and shortness of breath.    Cardiovascular: Negative for  chest pain, palpitations and leg swelling.   Gastrointestinal: Negative for abdominal distention, constipation, diarrhea and GERD.   Endocrine: Negative for cold intolerance and heat intolerance.   Genitourinary: Positive for nocturia. Negative for urinary incontinence.   Musculoskeletal: Positive for arthralgias and gait problem.   Neurological: Negative for weakness.   Hematological: Does not bruise/bleed easily.   Psychiatric/Behavioral: Negative for sleep disturbance and depressed mood. The patient is not nervous/anxious.        Objective   Physical Exam  Constitutional:       Appearance: Normal appearance. He is obese.   HENT:      Head: Normocephalic.      Right Ear: Tympanic membrane normal.      Left Ear: Tympanic membrane normal.      Nose: Nose normal.      Mouth/Throat:      Mouth: Mucous membranes are moist.   Eyes:      Pupils: Pupils are equal, round, and reactive to light.   Cardiovascular:      Rate and Rhythm: Normal rate and regular rhythm.      Pulses: Normal pulses.      Heart sounds: Normal heart sounds.   Pulmonary:      Effort: Pulmonary effort is normal.      Breath sounds: Normal breath sounds.   Abdominal:      General: Bowel sounds are normal.      Palpations: Abdomen is soft.      Tenderness: There is no right CVA tenderness or left CVA tenderness.   Musculoskeletal:         General: Deformity (L hand , limited ROM post MVa and surgeries, can .) present.      Cervical back: Normal range of motion.      Right knee: Decreased range of motion.      Left knee: Decreased range of motion.      Comments: Patient reports stiffness of bilateral legs, abnormal gait due to knee pain and stiffness   Skin:     General: Skin is warm.   Neurological:      General: No focal deficit present.      Mental Status: He is alert.   Psychiatric:         Mood and Affect: Mood normal.         Vitals:    04/25/22 1259   BP: 168/80   Pulse: 71   Temp: 98.2 °F (36.8 °C)   SpO2: 98%     Body mass index is 31.64  kg/m².    Procedures    Assessment/Plan   Problems Addressed this Visit    None     Visit Diagnoses     Screening, lipid    -  Primary    Relevant Orders    Lipid panel    Essential (primary) hypertension        Relevant Medications    amLODIPine (NORVASC) 10 MG tablet    Stage 3 chronic kidney disease, unspecified whether stage 3a or 3b CKD (HCC)        Primary osteoarthritis of both knees        Relevant Orders    Ambulatory Referral to Orthopedic Surgery    Nocturia        Screening for metabolic disorder        Relevant Orders    CBC & Differential    Comprehensive metabolic panel    TSH    Encounter for hepatitis C screening test for low risk patient        Relevant Orders    Hepatitis C Antibody    Screening for malignant neoplasm of prostate        Relevant Orders    PSA SCREENING    Screen for colon cancer        Relevant Orders    Cologuard - Stool, Per Rectum    History of gout        Relevant Orders    Uric acid      Diagnoses       Codes Comments    Screening, lipid    -  Primary ICD-10-CM: Z13.220  ICD-9-CM: V77.91     Essential (primary) hypertension     ICD-10-CM: I10  ICD-9-CM: 401.9     Stage 3 chronic kidney disease, unspecified whether stage 3a or 3b CKD (HCC)     ICD-10-CM: N18.30  ICD-9-CM: 585.3     Primary osteoarthritis of both knees     ICD-10-CM: M17.0  ICD-9-CM: 715.16     Nocturia     ICD-10-CM: R35.1  ICD-9-CM: 788.43     Screening for metabolic disorder     ICD-10-CM: Z13.228  ICD-9-CM: V77.99     Encounter for hepatitis C screening test for low risk patient     ICD-10-CM: Z11.59  ICD-9-CM: V73.89     Screening for malignant neoplasm of prostate     ICD-10-CM: Z12.5  ICD-9-CM: V76.44     Screen for colon cancer     ICD-10-CM: Z12.11  ICD-9-CM: V76.51     History of gout     ICD-10-CM: Z87.39  ICD-9-CM: V12.29       CBC  CMP  Lipid  TSH  Uric acid  PSA screen  Hep C screen    Hypertension-refill amlodipine 10 mg daily, check labs, encourage heart healthy diet, low-sodium, walk daily,  drink water and encourage weight loss.  Reviewed side effect profile of medication, including lower leg and pedal edema.    OA knees-refer to Ortho for steroid injection and eval, encourage patient to use ice/heat with barrier on and off every 30 minutes, use topical rubs for pain.  No NSAIDs due to CKD.  Use extra strength Tylenol 2 tabs 3 times daily as needed for OA pain.  Check labs.  No allopurinol refill at this time due to CKD, will check uric acid.    CKD- check labs, no NSAIDs at this time, use Tylenol only for pain.  We will check uric acid, did not refill allopurinol until renal function evaluated.  Hydrate with water.    Nocturia-check labs and PSA.  Encourage blood pressure control, limit bladder irritants.  No new medications until labs reviewed.    Return to clinic in 6 months  Set up MyChart and send any questions or concerns to this provider  Education attached to AVS for hypertension and CKD         Return in about 6 months (around 10/25/2022) for Recheck, Annual physical.

## 2022-04-27 DIAGNOSIS — N18.32 STAGE 3B CHRONIC KIDNEY DISEASE: Primary | ICD-10-CM

## 2022-04-27 DIAGNOSIS — I10 PRIMARY HYPERTENSION: ICD-10-CM

## 2022-04-27 DIAGNOSIS — M1A.39X0 CHRONIC GOUT DUE TO RENAL IMPAIRMENT OF MULTIPLE SITES WITHOUT TOPHUS: ICD-10-CM

## 2022-04-27 LAB
ALBUMIN SERPL-MCNC: 4.7 G/DL (ref 3.7–4.7)
ALBUMIN/GLOB SERPL: 1.7 {RATIO} (ref 1.2–2.2)
ALP SERPL-CCNC: 111 IU/L (ref 44–121)
ALT SERPL-CCNC: 11 IU/L (ref 0–44)
AST SERPL-CCNC: 14 IU/L (ref 0–40)
BASOPHILS # BLD AUTO: 0 X10E3/UL (ref 0–0.2)
BASOPHILS NFR BLD AUTO: 1 %
BILIRUB SERPL-MCNC: 0.5 MG/DL (ref 0–1.2)
BUN SERPL-MCNC: 31 MG/DL (ref 8–27)
BUN/CREAT SERPL: 15 (ref 10–24)
CALCIUM SERPL-MCNC: 9.4 MG/DL (ref 8.6–10.2)
CHLORIDE SERPL-SCNC: 108 MMOL/L (ref 96–106)
CHOLEST SERPL-MCNC: 179 MG/DL (ref 100–199)
CO2 SERPL-SCNC: 20 MMOL/L (ref 20–29)
CREAT SERPL-MCNC: 2.13 MG/DL (ref 0.76–1.27)
EGFRCR SERPLBLD CKD-EPI 2021: 32 ML/MIN/1.73
EOSINOPHIL # BLD AUTO: 0.2 X10E3/UL (ref 0–0.4)
EOSINOPHIL NFR BLD AUTO: 2 %
ERYTHROCYTE [DISTWIDTH] IN BLOOD BY AUTOMATED COUNT: 13.6 % (ref 11.6–15.4)
GLOBULIN SER CALC-MCNC: 2.7 G/DL (ref 1.5–4.5)
GLUCOSE SERPL-MCNC: 81 MG/DL (ref 65–99)
HCT VFR BLD AUTO: 36.6 % (ref 37.5–51)
HCV AB S/CO SERPL IA: <0.1 S/CO RATIO (ref 0–0.9)
HDLC SERPL-MCNC: 48 MG/DL
HGB BLD-MCNC: 12 G/DL (ref 13–17.7)
IMM GRANULOCYTES # BLD AUTO: 0 X10E3/UL (ref 0–0.1)
IMM GRANULOCYTES NFR BLD AUTO: 0 %
LDLC SERPL CALC-MCNC: 113 MG/DL (ref 0–99)
LYMPHOCYTES # BLD AUTO: 1.9 X10E3/UL (ref 0.7–3.1)
LYMPHOCYTES NFR BLD AUTO: 23 %
MCH RBC QN AUTO: 30.5 PG (ref 26.6–33)
MCHC RBC AUTO-ENTMCNC: 32.8 G/DL (ref 31.5–35.7)
MCV RBC AUTO: 93 FL (ref 79–97)
MONOCYTES # BLD AUTO: 0.4 X10E3/UL (ref 0.1–0.9)
MONOCYTES NFR BLD AUTO: 5 %
NEUTROPHILS # BLD AUTO: 5.9 X10E3/UL (ref 1.4–7)
NEUTROPHILS NFR BLD AUTO: 69 %
PLATELET # BLD AUTO: 251 X10E3/UL (ref 150–450)
POTASSIUM SERPL-SCNC: 4.7 MMOL/L (ref 3.5–5.2)
PROT SERPL-MCNC: 7.4 G/DL (ref 6–8.5)
PSA SERPL-MCNC: 4 NG/ML (ref 0–4)
RBC # BLD AUTO: 3.93 X10E6/UL (ref 4.14–5.8)
SODIUM SERPL-SCNC: 143 MMOL/L (ref 134–144)
TRIGL SERPL-MCNC: 98 MG/DL (ref 0–149)
TSH SERPL DL<=0.005 MIU/L-ACNC: 2.38 UIU/ML (ref 0.45–4.5)
URATE SERPL-MCNC: 8.8 MG/DL (ref 3.8–8.4)
VLDLC SERPL CALC-MCNC: 18 MG/DL (ref 5–40)
WBC # BLD AUTO: 8.4 X10E3/UL (ref 3.4–10.8)

## 2022-04-29 ENCOUNTER — OFFICE VISIT (OUTPATIENT)
Dept: ORTHOPEDIC SURGERY | Facility: CLINIC | Age: 72
End: 2022-04-29

## 2022-04-29 VITALS — WEIGHT: 162 LBS | HEIGHT: 60 IN | BODY MASS INDEX: 31.8 KG/M2

## 2022-04-29 DIAGNOSIS — M17.0 PRIMARY OSTEOARTHRITIS OF KNEES, BILATERAL: Primary | ICD-10-CM

## 2022-04-29 PROCEDURE — 20610 DRAIN/INJ JOINT/BURSA W/O US: CPT | Performed by: NURSE PRACTITIONER

## 2022-04-29 PROCEDURE — 99213 OFFICE O/P EST LOW 20 MIN: CPT | Performed by: NURSE PRACTITIONER

## 2022-04-29 PROCEDURE — 73562 X-RAY EXAM OF KNEE 3: CPT | Performed by: NURSE PRACTITIONER

## 2022-04-29 RX ORDER — LIDOCAINE HYDROCHLORIDE 10 MG/ML
8 INJECTION, SOLUTION EPIDURAL; INFILTRATION; INTRACAUDAL; PERINEURAL
Status: COMPLETED | OUTPATIENT
Start: 2022-04-29 | End: 2022-04-29

## 2022-04-29 RX ORDER — TRIAMCINOLONE ACETONIDE 40 MG/ML
80 INJECTION, SUSPENSION INTRA-ARTICULAR; INTRAMUSCULAR
Status: COMPLETED | OUTPATIENT
Start: 2022-04-29 | End: 2022-04-29

## 2022-04-29 RX ADMIN — TRIAMCINOLONE ACETONIDE 80 MG: 40 INJECTION, SUSPENSION INTRA-ARTICULAR; INTRAMUSCULAR at 11:20

## 2022-04-29 RX ADMIN — LIDOCAINE HYDROCHLORIDE 8 ML: 10 INJECTION, SOLUTION EPIDURAL; INFILTRATION; INTRACAUDAL; PERINEURAL at 11:20
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
## 2022-04-29 NOTE — PROGRESS NOTES
Returns to clinic with son for evaluation bilateral knees. Last received injections bilateral knees in August 2021. Did have follow-up with surgeon to discuss total knee arthroplasty and did not show up for management. He presents today with pain bilateral knees greater right than left. Pain localized to the medial joint line as well as the anterior aspect of the knee. Increased pain noted with transitional activity such as seated standing attempting to walk, ambulating long distances and standing for extended periods of time. He has noted significant anatomy deformity of the right knee. Denies other concerns present this time.    Plan:  1. Discussed plan of care with patient. Discussed total knee arthroplasty at the right knee versus corticosteroid injections. At this time wishes to hold off on any total knee arthroplasty. Wishes to proceed corticosteroid injection bilateral knees. Plan to see him back in clinic in approximately 5 weeks to reevaluate. May consider viscosupplementation injections at that time. Again will discuss total knee arthroplasty. All questions answered.

## 2022-06-10 ENCOUNTER — OFFICE VISIT (OUTPATIENT)
Dept: ORTHOPEDIC SURGERY | Facility: CLINIC | Age: 72
End: 2022-06-10

## 2022-06-10 VITALS — BODY MASS INDEX: 31.8 KG/M2 | WEIGHT: 162 LBS | HEIGHT: 60 IN

## 2022-06-10 DIAGNOSIS — M17.0 PRIMARY OSTEOARTHRITIS OF KNEES, BILATERAL: Primary | ICD-10-CM

## 2022-06-10 PROCEDURE — 99214 OFFICE O/P EST MOD 30 MIN: CPT | Performed by: NURSE PRACTITIONER

## 2022-06-10 RX ORDER — METHYLPREDNISOLONE 4 MG/1
TABLET ORAL
Qty: 21 TABLET | Refills: 0 | Status: SHIPPED | OUTPATIENT
Start: 2022-06-10 | End: 2022-08-22
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
## 2022-08-22 ENCOUNTER — OFFICE VISIT (OUTPATIENT)
Dept: FAMILY MEDICINE CLINIC | Facility: CLINIC | Age: 72
End: 2022-08-22

## 2022-08-22 VITALS
HEART RATE: 82 BPM | OXYGEN SATURATION: 99 % | SYSTOLIC BLOOD PRESSURE: 172 MMHG | DIASTOLIC BLOOD PRESSURE: 66 MMHG | TEMPERATURE: 98.2 F | WEIGHT: 161 LBS | BODY MASS INDEX: 31.61 KG/M2 | HEIGHT: 60 IN

## 2022-08-22 DIAGNOSIS — M54.42 ACUTE LEFT-SIDED LOW BACK PAIN WITH LEFT-SIDED SCIATICA: Primary | ICD-10-CM

## 2022-08-22 DIAGNOSIS — M79.18 PIRIFORMIS MUSCLE PAIN: ICD-10-CM

## 2022-08-22 PROCEDURE — 99213 OFFICE O/P EST LOW 20 MIN: CPT | Performed by: STUDENT IN AN ORGANIZED HEALTH CARE EDUCATION/TRAINING PROGRAM

## 2022-08-22 RX ORDER — METHOCARBAMOL 750 MG/1
750 TABLET, FILM COATED ORAL 3 TIMES DAILY
Qty: 21 TABLET | Refills: 0 | Status: SHIPPED | OUTPATIENT
Start: 2022-08-22 | End: 2022-08-29

## 2022-08-22 NOTE — PROGRESS NOTES
"    Rusty Ziegler DO  [facility]  [address]  [place] [address] [phone]    Subjective      Name Pato Charles MRN [id number]    1950 AGE/SEX 72 y.o. / male      Chief Complaint Chief Complaint   Patient presents with   • Pain     Upper back down to legs. Pt has gone to a chiropractor, but stopped going once his insurance stopped covering it. Pain has been happening for 3-4 weeks, started with his knees.          Visit History for  2022    History of Present Illness  Pato Charles is a 72 y.o. male who presented today for evaluation of upper back pain that radiates down legs.  Pain is been ongoing for the last 3 to 4 weeks.  He has gone to a chiropractor, but insurance stopped covering.  Initial evaluation and treatment did help.  8-10 pain today.  Has to sit down after standing for about 10 min or so.  Pain mostly located in the left buttock area of the low back.  Pain radiates down the posterior left leg and occasionally shooting down to the back part of the foot.  Sitting makes it better.  He has not taken any medications over-the-counter.  He is currently retired      Medications and Allergies   Current Outpatient Medications   Medication Instructions   • methocarbamol (ROBAXIN) 750 mg, Oral, 3 Times Daily   • methylPREDNISolone (MEDROL) 4 MG dose pack Use as directed by package instructions     No Known Allergies   I have reviewed the above medications and allergies     Objective:      Vitals Vitals:    22 0921   BP: 172/66   BP Location: Right arm   Patient Position: Sitting   Cuff Size: Large Adult   Pulse: 82   Temp: 98.2 °F (36.8 °C)   TempSrc: Temporal   SpO2: 99%   Weight: 73 kg (161 lb)   Height: 152.4 cm (60\")   PainSc:   8   PainLoc: Back     Body mass index is 31.44 kg/m².    Physical Exam  Vitals reviewed.   Constitutional:       General: He is not in acute distress.     Appearance: He is not ill-appearing.      Comments: Bent forward " posture.   Pulmonary:      Effort: Pulmonary effort is normal.   Musculoskeletal:      Comments: Kyphotic curve noted.  Tenderness to palpation over the left buttocks region.  Deep palpation of piriformis exacerbates patient's problem.  Right-sided negative   Psychiatric:         Mood and Affect: Mood normal.         Behavior: Behavior normal.         Thought Content: Thought content normal.         Judgment: Judgment normal.            Assessment/Plan      Issues Addressed/ Plan  1. Acute left-sided low back pain with left-sided sciatica  - Pain appears most likely secondary to piriformis syndrome.  Hypertonicity of the left piriformis was noted on exam.  - Going to provide muscle relaxer to help with spasm, and encourage patient to utilize Tylenol and ibuprofen interchangeably in order to help with pain.  - May consider x-ray of the lumbar spine in the future, but does not appear to be related to skeletal at this time.  - No PT consideration at this time as patient has acute, possible if pain persists after treatment.  - Patient instructions given for home treatment below  - methocarbamol (ROBAXIN) 750 MG tablet; Take 1 tablet by mouth 3 (Three) Times a Day for 7 days.  Dispense: 21 tablet; Refill: 0    2. Piriformis muscle pain  - Treatment same as above  - methocarbamol (ROBAXIN) 750 MG tablet; Take 1 tablet by mouth 3 (Three) Times a Day for 7 days.  Dispense: 21 tablet; Refill: 0     Patient Instructions   You can take tylenol for your pain up to 4000 mg total in a day.      You can also take ibuprofen 800 mg 3 times a day for pain as well.      Do ice 15 min at a time 3-4 times a day and then you can do heat as well 15 min 3-4 times a day as well.  Just switch between the two.          Follow up  recommended Return in about 1 week (around 8/29/2022), or if symptoms worsen or fail to improve, for low back pain.     Rusty Ziegler, DO

## 2022-08-22 NOTE — PATIENT INSTRUCTIONS
You can take tylenol for your pain up to 4000 mg total in a day.      You can also take ibuprofen 800 mg 3 times a day for pain as well.      Do ice 15 min at a time 3-4 times a day and then you can do heat as well 15 min 3-4 times a day as well.  Just switch between the two.

## 2022-08-29 ENCOUNTER — OFFICE VISIT (OUTPATIENT)
Dept: FAMILY MEDICINE CLINIC | Facility: CLINIC | Age: 72
End: 2022-08-29

## 2022-08-29 VITALS
HEIGHT: 60 IN | DIASTOLIC BLOOD PRESSURE: 74 MMHG | WEIGHT: 160 LBS | OXYGEN SATURATION: 96 % | SYSTOLIC BLOOD PRESSURE: 136 MMHG | TEMPERATURE: 98.7 F | BODY MASS INDEX: 31.41 KG/M2 | HEART RATE: 78 BPM

## 2022-08-29 DIAGNOSIS — M99.02 SOMATIC DYSFUNCTION OF THORACIC REGION: ICD-10-CM

## 2022-08-29 DIAGNOSIS — M79.18 PIRIFORMIS MUSCLE PAIN: Primary | ICD-10-CM

## 2022-08-29 DIAGNOSIS — M99.04 SOMATIC DYSFUNCTION OF SACRAL REGION: ICD-10-CM

## 2022-08-29 DIAGNOSIS — M99.03 SOMATIC DYSFUNCTION OF LUMBAR REGION: ICD-10-CM

## 2022-08-29 DIAGNOSIS — M99.05 SOMATIC DYSFUNCTION OF PELVIS REGION: ICD-10-CM

## 2022-08-29 PROCEDURE — 99213 OFFICE O/P EST LOW 20 MIN: CPT | Performed by: STUDENT IN AN ORGANIZED HEALTH CARE EDUCATION/TRAINING PROGRAM

## 2022-08-29 PROCEDURE — 98926 OSTEOPATH MANJ 3-4 REGIONS: CPT | Performed by: STUDENT IN AN ORGANIZED HEALTH CARE EDUCATION/TRAINING PROGRAM

## 2022-08-29 RX ORDER — TIZANIDINE 4 MG/1
4 TABLET ORAL EVERY 8 HOURS
Qty: 15 TABLET | Refills: 0 | Status: SHIPPED | OUTPATIENT
Start: 2022-08-29 | End: 2022-09-03

## 2022-08-29 NOTE — PROGRESS NOTES
"    Rusty Ziegler DO  [facility]  [address]  [place] [id number] [phone]    Subjective      Name Pato Charles MRN [id number]    1950 AGE/SEX 72 y.o. / male      Chief Complaint Chief Complaint   Patient presents with   • Follow-up     Acute left-sided low back pain with left-sided sciatica         Visit History for  2022    History of Present Illness  Pato Charles is a 72 y.o. male who presented for follow-up on acute left-sided low back pain with left-sided sciatica.  This is secondary to piriformitis.    He was given Robaxin 750 mg 3 times a day.  He states that this medication did not provide much relief.  His pain continues with a pain level of 8/10.  Radiation down the left leg.  Still has point tenderness over piriformis.  Standing makes it worse, and sitting makes it better.  Laying flat makes it worse as well.        Medications and Allergies   Current Outpatient Medications   Medication Instructions   • tiZANidine (ZANAFLEX) 4 mg, Oral, Every 8 Hours     No Known Allergies   I have reviewed the above medications and allergies     Objective:      Vitals Vitals:    22 0916   BP: 136/74   BP Location: Right arm   Patient Position: Sitting   Cuff Size: Large Adult   Pulse: 78   Temp: 98.7 °F (37.1 °C)   TempSrc: Temporal   SpO2: 96%   Weight: 72.6 kg (160 lb)   Height: 152.4 cm (60\")     Body mass index is 31.25 kg/m².    Physical Exam  Vitals reviewed.   Constitutional:       General: He is not in acute distress.     Appearance: He is not ill-appearing.      Comments: Bent forward posture   Musculoskeletal:      Right lower leg: No edema.      Left lower leg: No edema.      Comments: Point tenderness noted over the piriformis.  Hypertonicity also present   Psychiatric:         Mood and Affect: Mood normal.         Behavior: Behavior normal.         Thought Content: Thought content normal.         Judgment: Judgment normal.      Osteopathic " Manipulative Treatment - OMT - Procedure Note    OMT Procedure  Indications: TART findings, muscle spasm, pain    Risks and benefits discussed and patient gave verbal consent to treat    Regions treated: T- Spine, L-Spine, Pelvis and Sacrum    Findings: T-spine: T12 FSRL  L-spine: L3-5NRrSL  Pelvis: Left anterior   Sacrum: L on L torsion    Modalities: HVLA, Indirect Myofascial Release, Functional Technique and Still Technique    Patient reports decreased pain, improved range of motion, and improved functionality after treatment. There were no adverse effects.       Assessment/Plan      Issues Addressed/ Plan  1. Piriformis muscle pain  - Patient was unable to improve with use of Robaxin.  Patient still having difficulty sleeping.  Going to switch to tizanidine 5 mg every 8 hours.  Patient was warned that he will be impaired slightly with this medication and should avoid driving or operating heavy machinery.  This may also help him sleep at night.  - Performed osteopathic manipulation today in order to help improve pain as well.  His pain level was an 8/10 prior to treatment and was decreased to a 6/10 afterwards.  With noted improvement in motion and level of restriction.  Patient also able to ambulate better after treatment.  He was advised that he may experience tenderness for the next 24 hours.  He is encouraged to use ice and heat.  He should also drink plenty of fluids.    2. Somatic dysfunction of thoracic region  3. Somatic dysfunction of lumbar region  4. Somatic dysfunction of sacral region  5. Somatic dysfunction of pelvis region  -Osteopathic manipulation was utilized in order to improve somatic dysfunction and the above-mentioned areas.  There was noted improvement and motion testing posttreatment and decrease in pain as stated above.  Patient tolerated treatment well    Patient Instructions   Keep doing ice and heat.      Try the tizanadine and lets see if it helps with sleep        Follow up   recommended Return in about 1 week (around 9/5/2022) for low back pain.     Rusty Ziegler, DO

## 2022-09-27 RX ORDER — AMLODIPINE BESYLATE 10 MG/1
TABLET ORAL
Qty: 90 TABLET | Refills: 1 | Status: SHIPPED | OUTPATIENT
Start: 2022-09-27 | End: 2023-01-20 | Stop reason: SDUPTHER

## 2023-01-20 RX ORDER — AMLODIPINE BESYLATE 10 MG/1
10 TABLET ORAL DAILY
Qty: 90 TABLET | Refills: 1 | Status: SHIPPED | OUTPATIENT
Start: 2023-01-20

## 2023-01-20 NOTE — TELEPHONE ENCOUNTER
Caller: Pato Charles    Relationship: Self    Best call back number: [phone]    Requested Prescriptions:   Requested Prescriptions     Pending Prescriptions Disp Refills   • amLODIPine (NORVASC) 10 MG tablet 90 tablet 1     Sig: Take 1 tablet by mouth Daily.        Pharmacy where request should be sent: [id number] - [place] [address] AT [facility] & [address] - [phone]  - [phone] FX     Additional details provided by patient: PATIENT HAS LOST PRESCRIPTION NEEDS A NEW PRESCRIPTION SENT OVER TO PHARMACY TO LAST UNTIL HIS APPOINTMENT     Does the patient have less than a 3 day supply:  [x] Yes  [] No    Would you like a call back once the refill request has been completed: [] Yes [] No    If the office needs to give you a call back, can they leave a voicemail: [] Yes [] No    Micaela Langford, Taylor Rep   01/20/23 14:31 EST

## 2023-02-13 ENCOUNTER — OFFICE VISIT (OUTPATIENT)
Dept: FAMILY MEDICINE CLINIC | Facility: CLINIC | Age: 73
End: 2023-02-13
Payer: MEDICARE

## 2023-02-13 VITALS
DIASTOLIC BLOOD PRESSURE: 98 MMHG | BODY MASS INDEX: 32.35 KG/M2 | RESPIRATION RATE: 16 BRPM | HEART RATE: 71 BPM | HEIGHT: 60 IN | OXYGEN SATURATION: 100 % | SYSTOLIC BLOOD PRESSURE: 168 MMHG | TEMPERATURE: 97.8 F | WEIGHT: 164.8 LBS

## 2023-02-13 DIAGNOSIS — Z00.00 ENCOUNTER FOR SUBSEQUENT ANNUAL WELLNESS VISIT (AWV) IN MEDICARE PATIENT: Primary | ICD-10-CM

## 2023-02-13 PROCEDURE — 99397 PER PM REEVAL EST PAT 65+ YR: CPT | Performed by: STUDENT IN AN ORGANIZED HEALTH CARE EDUCATION/TRAINING PROGRAM
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
## 2023-03-12 ENCOUNTER — APPOINTMENT (OUTPATIENT)
Dept: GENERAL RADIOLOGY | Facility: HOSPITAL | Age: 73
End: 2023-03-12
Payer: COMMERCIAL

## 2023-03-12 ENCOUNTER — HOSPITAL ENCOUNTER (EMERGENCY)
Facility: HOSPITAL | Age: 73
Discharge: HOME | End: 2023-03-12
Attending: EMERGENCY MEDICINE | Admitting: EMERGENCY MEDICINE
Payer: COMMERCIAL

## 2023-03-12 VITALS
TEMPERATURE: 98 F | RESPIRATION RATE: 18 BRPM | BODY MASS INDEX: 27.31 KG/M2 | DIASTOLIC BLOOD PRESSURE: 89 MMHG | WEIGHT: 160 LBS | SYSTOLIC BLOOD PRESSURE: 155 MMHG | OXYGEN SATURATION: 99 % | HEART RATE: 105 BPM | HEIGHT: 64 IN

## 2023-03-12 DIAGNOSIS — M79.18 MUSCULOSKELETAL PAIN: ICD-10-CM
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
## 2023-03-12 PROCEDURE — 99283 EMERGENCY DEPT VISIT LOW MDM: CPT

## 2023-03-12 PROCEDURE — 71120 X-RAY EXAM BREASTBONE 2/>VWS: CPT

## 2023-03-12 RX ORDER — IBUPROFEN 400 MG/1
800 TABLET ORAL ONCE
Status: COMPLETED | OUTPATIENT
Start: 2023-03-12 | End: 2023-03-12

## 2023-03-12 RX ORDER — MELOXICAM 15 MG/1
15 TABLET ORAL DAILY
Qty: 5 TABLET | Refills: 0 | Status: SHIPPED | OUTPATIENT
Start: 2023-03-12

## 2023-03-12 RX ADMIN — IBUPROFEN 800 MG: 400 TABLET, FILM COATED ORAL at 19:30
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
## 2023-05-01 ENCOUNTER — APPOINTMENT (OUTPATIENT)
Dept: GENERAL RADIOLOGY | Facility: HOSPITAL | Age: 73
End: 2023-05-01
Payer: MEDICARE

## 2023-05-01 ENCOUNTER — HOSPITAL ENCOUNTER (EMERGENCY)
Facility: HOSPITAL | Age: 73
Discharge: HOME | End: 2023-05-01
Attending: EMERGENCY MEDICINE | Admitting: EMERGENCY MEDICINE
Payer: MEDICARE

## 2023-05-01 VITALS
SYSTOLIC BLOOD PRESSURE: 180 MMHG | BODY MASS INDEX: 27.31 KG/M2 | TEMPERATURE: 98.5 F | DIASTOLIC BLOOD PRESSURE: 81 MMHG | RESPIRATION RATE: 18 BRPM | OXYGEN SATURATION: 97 % | HEIGHT: 64 IN | HEART RATE: 97 BPM | WEIGHT: 160 LBS

## 2023-05-01 DIAGNOSIS — R09.89 FOREIGN BODY SENSATION IN THROAT: Primary | ICD-10-CM

## 2023-05-01 PROCEDURE — 74220 X-RAY XM ESOPHAGUS 1CNTRST: CPT

## 2023-05-01 PROCEDURE — 99283 EMERGENCY DEPT VISIT LOW MDM: CPT

## 2023-05-01 NOTE — DISCHARGE INSTRUCTIONS
Continue medications as directed.  Follow-up with your PCP as above.  If your symptoms persist for 48-72 hours, follow-up with ENT.  Return to the ED for worsening symptoms or medical emergencies.
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
## 2024-02-01 ENCOUNTER — TELEPHONE (OUTPATIENT)
Dept: FAMILY MEDICINE CLINIC | Facility: CLINIC | Age: 74
End: 2024-02-01
Payer: MEDICARE